# Patient Record
Sex: MALE | Race: WHITE | Employment: OTHER | URBAN - METROPOLITAN AREA
[De-identification: names, ages, dates, MRNs, and addresses within clinical notes are randomized per-mention and may not be internally consistent; named-entity substitution may affect disease eponyms.]

---

## 2020-01-01 ENCOUNTER — APPOINTMENT (OUTPATIENT)
Dept: GENERAL RADIOLOGY | Facility: CLINIC | Age: 85
DRG: 641 | End: 2020-01-01
Attending: EMERGENCY MEDICINE
Payer: MEDICARE

## 2020-01-01 ENCOUNTER — PATIENT OUTREACH (OUTPATIENT)
Dept: CARE COORDINATION | Facility: CLINIC | Age: 85
End: 2020-01-01

## 2020-01-01 ENCOUNTER — RECORDS - HEALTHEAST (OUTPATIENT)
Dept: ADMINISTRATIVE | Facility: OTHER | Age: 85
End: 2020-01-01

## 2020-01-01 ENCOUNTER — HOSPITAL ENCOUNTER (OUTPATIENT)
Dept: OCCUPATIONAL THERAPY | Facility: CLINIC | Age: 85
Setting detail: THERAPIES SERIES
End: 2020-02-27
Attending: PSYCHIATRY & NEUROLOGY
Payer: MEDICARE

## 2020-01-01 ENCOUNTER — HOME CARE/HOSPICE - HEALTHEAST (OUTPATIENT)
Dept: HOSPICE | Facility: HOSPICE | Age: 85
End: 2020-01-01

## 2020-01-01 ENCOUNTER — MEDICAL CORRESPONDENCE (OUTPATIENT)
Dept: HEALTH INFORMATION MANAGEMENT | Facility: CLINIC | Age: 85
End: 2020-01-01

## 2020-01-01 ENCOUNTER — OFFICE VISIT (OUTPATIENT)
Dept: NEUROLOGY | Facility: CLINIC | Age: 85
End: 2020-01-01
Payer: MEDICARE

## 2020-01-01 ENCOUNTER — DOCUMENTATION ONLY (OUTPATIENT)
Dept: OTHER | Facility: CLINIC | Age: 85
End: 2020-01-01

## 2020-01-01 ENCOUNTER — HOME CARE/HOSPICE - HEALTHEAST (OUTPATIENT)
Dept: HOME HEALTH SERVICES | Facility: HOME HEALTH | Age: 85
End: 2020-01-01

## 2020-01-01 ENCOUNTER — TELEPHONE (OUTPATIENT)
Dept: FAMILY MEDICINE | Facility: CLINIC | Age: 85
End: 2020-01-01

## 2020-01-01 ENCOUNTER — OFFICE VISIT (OUTPATIENT)
Dept: FAMILY MEDICINE | Facility: CLINIC | Age: 85
End: 2020-01-01
Payer: MEDICARE

## 2020-01-01 ENCOUNTER — HOSPITAL ENCOUNTER (INPATIENT)
Facility: CLINIC | Age: 85
LOS: 1 days | Discharge: HOME | DRG: 641 | End: 2020-06-22
Attending: EMERGENCY MEDICINE | Admitting: HOSPITALIST
Payer: MEDICARE

## 2020-01-01 ENCOUNTER — HOSPITAL ENCOUNTER (OUTPATIENT)
Dept: MRI IMAGING | Facility: CLINIC | Age: 85
Discharge: HOME | End: 2020-02-19
Attending: PSYCHIATRY & NEUROLOGY | Admitting: PSYCHIATRY & NEUROLOGY
Payer: MEDICARE

## 2020-01-01 ENCOUNTER — APPOINTMENT (OUTPATIENT)
Dept: CT IMAGING | Facility: CLINIC | Age: 85
DRG: 641 | End: 2020-01-01
Attending: EMERGENCY MEDICINE
Payer: MEDICARE

## 2020-01-01 ENCOUNTER — VIRTUAL VISIT (OUTPATIENT)
Dept: FAMILY MEDICINE | Facility: CLINIC | Age: 85
End: 2020-01-01
Payer: MEDICARE

## 2020-01-01 ENCOUNTER — TELEPHONE (OUTPATIENT)
Dept: NEUROLOGY | Facility: CLINIC | Age: 85
End: 2020-01-01

## 2020-01-01 VITALS
HEART RATE: 57 BPM | OXYGEN SATURATION: 100 % | SYSTOLIC BLOOD PRESSURE: 142 MMHG | BODY MASS INDEX: 26.43 KG/M2 | WEIGHT: 165 LBS | DIASTOLIC BLOOD PRESSURE: 60 MMHG | TEMPERATURE: 97.6 F

## 2020-01-01 VITALS
TEMPERATURE: 97.5 F | SYSTOLIC BLOOD PRESSURE: 124 MMHG | WEIGHT: 163.5 LBS | BODY MASS INDEX: 26.28 KG/M2 | HEIGHT: 66 IN | HEART RATE: 61 BPM | DIASTOLIC BLOOD PRESSURE: 60 MMHG | OXYGEN SATURATION: 100 %

## 2020-01-01 VITALS
SYSTOLIC BLOOD PRESSURE: 120 MMHG | OXYGEN SATURATION: 98 % | TEMPERATURE: 98.8 F | HEART RATE: 78 BPM | DIASTOLIC BLOOD PRESSURE: 68 MMHG

## 2020-01-01 VITALS
TEMPERATURE: 97.6 F | WEIGHT: 165 LBS | SYSTOLIC BLOOD PRESSURE: 138 MMHG | HEART RATE: 57 BPM | BODY MASS INDEX: 26.43 KG/M2 | DIASTOLIC BLOOD PRESSURE: 62 MMHG

## 2020-01-01 VITALS — HEIGHT: 66 IN | BODY MASS INDEX: 26.52 KG/M2 | WEIGHT: 165 LBS

## 2020-01-01 VITALS
RESPIRATION RATE: 22 BRPM | OXYGEN SATURATION: 93 % | DIASTOLIC BLOOD PRESSURE: 61 MMHG | HEART RATE: 64 BPM | TEMPERATURE: 97.9 F | SYSTOLIC BLOOD PRESSURE: 149 MMHG

## 2020-01-01 VITALS — WEIGHT: 140 LBS | HEIGHT: 70 IN | BODY MASS INDEX: 20.04 KG/M2

## 2020-01-01 DIAGNOSIS — F02.80 OTHER FRONTOTEMPORAL DEMENTIA WITHOUT BEHAVIORAL DISTURBANCE: ICD-10-CM

## 2020-01-01 DIAGNOSIS — R63.0 APPETITE IMPAIRED: ICD-10-CM

## 2020-01-01 DIAGNOSIS — R41.3 MEMORY LOSS: ICD-10-CM

## 2020-01-01 DIAGNOSIS — F03.90 DEMENTIA WITHOUT BEHAVIORAL DISTURBANCE, UNSPECIFIED DEMENTIA TYPE: Primary | ICD-10-CM

## 2020-01-01 DIAGNOSIS — R26.89 BALANCE PROBLEMS: ICD-10-CM

## 2020-01-01 DIAGNOSIS — G31.09 OTHER FRONTOTEMPORAL DEMENTIA WITHOUT BEHAVIORAL DISTURBANCE: ICD-10-CM

## 2020-01-01 DIAGNOSIS — F41.9 ANXIETY: ICD-10-CM

## 2020-01-01 DIAGNOSIS — G47.00 INSOMNIA, UNSPECIFIED TYPE: Primary | ICD-10-CM

## 2020-01-01 DIAGNOSIS — Z00.00 ENCOUNTER FOR MEDICARE ANNUAL WELLNESS EXAM: Primary | ICD-10-CM

## 2020-01-01 DIAGNOSIS — R29.6 RECURRENT FALLS: ICD-10-CM

## 2020-01-01 DIAGNOSIS — N39.498 OTHER URINARY INCONTINENCE: ICD-10-CM

## 2020-01-01 DIAGNOSIS — R41.3 MEMORY LOSS: Primary | ICD-10-CM

## 2020-01-01 DIAGNOSIS — Z20.822 2019-NCOV NOT DETECTED: ICD-10-CM

## 2020-01-01 DIAGNOSIS — F03.90 DEMENTIA WITHOUT BEHAVIORAL DISTURBANCE, UNSPECIFIED DEMENTIA TYPE: ICD-10-CM

## 2020-01-01 DIAGNOSIS — M79.89 SWELLING OF LOWER EXTREMITY: ICD-10-CM

## 2020-01-01 DIAGNOSIS — F02.80 DEMENTIA ASSOCIATED WITH OTHER UNDERLYING DISEASE WITHOUT BEHAVIORAL DISTURBANCE (H): Primary | ICD-10-CM

## 2020-01-01 LAB
ALBUMIN SERPL-MCNC: 3.5 G/DL (ref 3.4–5)
ALBUMIN SERPL-MCNC: 4 G/DL (ref 3.4–5)
ALBUMIN UR-MCNC: 10 MG/DL
ALP SERPL-CCNC: 65 U/L (ref 40–150)
ALP SERPL-CCNC: 73 U/L (ref 40–150)
ALT SERPL W P-5'-P-CCNC: 17 U/L (ref 0–70)
ALT SERPL W P-5'-P-CCNC: 22 U/L (ref 0–70)
ANION GAP SERPL CALCULATED.3IONS-SCNC: 3 MMOL/L (ref 3–14)
ANION GAP SERPL CALCULATED.3IONS-SCNC: 6 MMOL/L (ref 3–14)
ANION GAP SERPL CALCULATED.3IONS-SCNC: 8 MMOL/L (ref 3–14)
APPEARANCE UR: CLEAR
AST SERPL W P-5'-P-CCNC: 14 U/L (ref 0–45)
AST SERPL W P-5'-P-CCNC: 46 U/L (ref 0–45)
BASOPHILS # BLD AUTO: 0.1 10E9/L (ref 0–0.2)
BASOPHILS NFR BLD AUTO: 0.5 %
BILIRUB SERPL-MCNC: 0.8 MG/DL (ref 0.2–1.3)
BILIRUB SERPL-MCNC: 1.6 MG/DL (ref 0.2–1.3)
BILIRUB UR QL STRIP: NEGATIVE
BUN SERPL-MCNC: 13 MG/DL (ref 7–30)
BUN SERPL-MCNC: 17 MG/DL (ref 7–30)
BUN SERPL-MCNC: 19 MG/DL (ref 7–30)
CALCIUM SERPL-MCNC: 8.7 MG/DL (ref 8.5–10.1)
CALCIUM SERPL-MCNC: 8.8 MG/DL (ref 8.5–10.1)
CALCIUM SERPL-MCNC: 8.9 MG/DL (ref 8.5–10.1)
CHLORIDE SERPL-SCNC: 109 MMOL/L (ref 94–109)
CHLORIDE SERPL-SCNC: 110 MMOL/L (ref 94–109)
CHLORIDE SERPL-SCNC: 110 MMOL/L (ref 94–109)
CO2 SERPL-SCNC: 24 MMOL/L (ref 20–32)
CO2 SERPL-SCNC: 24 MMOL/L (ref 20–32)
CO2 SERPL-SCNC: 30 MMOL/L (ref 20–32)
COLOR UR AUTO: YELLOW
CREAT SERPL-MCNC: 0.66 MG/DL (ref 0.66–1.25)
CREAT SERPL-MCNC: 0.75 MG/DL (ref 0.66–1.25)
CREAT SERPL-MCNC: 0.78 MG/DL (ref 0.66–1.25)
CRP SERPL-MCNC: 5.2 MG/L (ref 0–8)
DIFFERENTIAL METHOD BLD: ABNORMAL
EOSINOPHIL # BLD AUTO: 0.1 10E9/L (ref 0–0.7)
EOSINOPHIL NFR BLD AUTO: 0.7 %
ERYTHROCYTE [DISTWIDTH] IN BLOOD BY AUTOMATED COUNT: 18.8 % (ref 10–15)
ERYTHROCYTE [DISTWIDTH] IN BLOOD BY AUTOMATED COUNT: 19 % (ref 10–15)
ERYTHROCYTE [DISTWIDTH] IN BLOOD BY AUTOMATED COUNT: 19.2 % (ref 10–15)
GFR SERPL CREATININE-BSD FRML MDRD: 82 ML/MIN/{1.73_M2}
GFR SERPL CREATININE-BSD FRML MDRD: 83 ML/MIN/{1.73_M2}
GFR SERPL CREATININE-BSD FRML MDRD: 88 ML/MIN/{1.73_M2}
GLUCOSE SERPL-MCNC: 73 MG/DL (ref 70–99)
GLUCOSE SERPL-MCNC: 87 MG/DL (ref 70–99)
GLUCOSE SERPL-MCNC: 94 MG/DL (ref 70–99)
GLUCOSE UR STRIP-MCNC: NEGATIVE MG/DL
HCT VFR BLD AUTO: 35.8 % (ref 40–53)
HCT VFR BLD AUTO: 36.7 % (ref 40–53)
HCT VFR BLD AUTO: 38.2 % (ref 40–53)
HGB BLD-MCNC: 11.4 G/DL (ref 13.3–17.7)
HGB BLD-MCNC: 11.7 G/DL (ref 13.3–17.7)
HGB BLD-MCNC: 12.3 G/DL (ref 13.3–17.7)
HGB UR QL STRIP: ABNORMAL
IMM GRANULOCYTES # BLD: 0 10E9/L (ref 0–0.4)
IMM GRANULOCYTES NFR BLD: 0.4 %
INTERPRETATION ECG - MUSE: NORMAL
KETONES UR STRIP-MCNC: 10 MG/DL
LEUKOCYTE ESTERASE UR QL STRIP: NEGATIVE
LYMPHOCYTES # BLD AUTO: 1.7 10E9/L (ref 0.8–5.3)
LYMPHOCYTES NFR BLD AUTO: 16.1 %
MCH RBC QN AUTO: 29.2 PG (ref 26.5–33)
MCH RBC QN AUTO: 29.4 PG (ref 26.5–33)
MCH RBC QN AUTO: 29.8 PG (ref 26.5–33)
MCHC RBC AUTO-ENTMCNC: 31.8 G/DL (ref 31.5–36.5)
MCHC RBC AUTO-ENTMCNC: 31.9 G/DL (ref 31.5–36.5)
MCHC RBC AUTO-ENTMCNC: 32.2 G/DL (ref 31.5–36.5)
MCV RBC AUTO: 91 FL (ref 78–100)
MCV RBC AUTO: 92 FL (ref 78–100)
MCV RBC AUTO: 93 FL (ref 78–100)
METHYLMALONATE SERPL-SCNC: 0.66 UMOL/L (ref 0–0.4)
MONOCYTES # BLD AUTO: 1 10E9/L (ref 0–1.3)
MONOCYTES NFR BLD AUTO: 9.8 %
MUCOUS THREADS #/AREA URNS LPF: PRESENT /LPF
NEUTROPHILS # BLD AUTO: 7.4 10E9/L (ref 1.6–8.3)
NEUTROPHILS NFR BLD AUTO: 72.5 %
NITRATE UR QL: NEGATIVE
NRBC # BLD AUTO: 0 10*3/UL
NRBC BLD AUTO-RTO: 0 /100
PH UR STRIP: 6 PH (ref 5–7)
PLATELET # BLD AUTO: 298 10E9/L (ref 150–450)
PLATELET # BLD AUTO: 310 10E9/L (ref 150–450)
PLATELET # BLD AUTO: 357 10E9/L (ref 150–450)
POTASSIUM SERPL-SCNC: 3.6 MMOL/L (ref 3.4–5.3)
POTASSIUM SERPL-SCNC: 3.9 MMOL/L (ref 3.4–5.3)
POTASSIUM SERPL-SCNC: 4 MMOL/L (ref 3.4–5.3)
PROT SERPL-MCNC: 7.2 G/DL (ref 6.8–8.8)
PROT SERPL-MCNC: 7.5 G/DL (ref 6.8–8.8)
RBC # BLD AUTO: 3.91 10E12/L (ref 4.4–5.9)
RBC # BLD AUTO: 3.93 10E12/L (ref 4.4–5.9)
RBC # BLD AUTO: 4.19 10E12/L (ref 4.4–5.9)
RBC #/AREA URNS AUTO: <1 /HPF (ref 0–2)
SARS-COV-2 RNA SPEC QL NAA+PROBE: NOT DETECTED
SODIUM SERPL-SCNC: 140 MMOL/L (ref 133–144)
SODIUM SERPL-SCNC: 141 MMOL/L (ref 133–144)
SODIUM SERPL-SCNC: 143 MMOL/L (ref 133–144)
SOURCE: ABNORMAL
SP GR UR STRIP: 1.02 (ref 1–1.03)
SPECIMEN SOURCE: NORMAL
T4 FREE SERPL-MCNC: 0.85 NG/DL (ref 0.76–1.46)
TSH SERPL DL<=0.005 MIU/L-ACNC: 2.87 MU/L (ref 0.4–4)
TSH SERPL DL<=0.005 MIU/L-ACNC: 4.41 MU/L (ref 0.4–4)
UROBILINOGEN UR STRIP-MCNC: NORMAL MG/DL (ref 0–2)
VIT B1 BLD-MCNC: 167 NMOL/L (ref 70–180)
VIT B12 SERPL-MCNC: 253 PG/ML (ref 193–986)
WBC # BLD AUTO: 10 10E9/L (ref 4–11)
WBC # BLD AUTO: 10.2 10E9/L (ref 4–11)
WBC # BLD AUTO: 11.5 10E9/L (ref 4–11)
WBC #/AREA URNS AUTO: 2 /HPF (ref 0–5)

## 2020-01-01 PROCEDURE — G0378 HOSPITAL OBSERVATION PER HR: HCPCS

## 2020-01-01 PROCEDURE — 84425 ASSAY OF VITAMIN B-1: CPT | Mod: 90 | Performed by: PSYCHIATRY & NEUROLOGY

## 2020-01-01 PROCEDURE — 72125 CT NECK SPINE W/O DYE: CPT

## 2020-01-01 PROCEDURE — 80048 BASIC METABOLIC PNL TOTAL CA: CPT | Performed by: HOSPITALIST

## 2020-01-01 PROCEDURE — 84443 ASSAY THYROID STIM HORMONE: CPT | Performed by: EMERGENCY MEDICINE

## 2020-01-01 PROCEDURE — 25800030 ZZH RX IP 258 OP 636: Performed by: HOSPITALIST

## 2020-01-01 PROCEDURE — 72170 X-RAY EXAM OF PELVIS: CPT

## 2020-01-01 PROCEDURE — 99239 HOSP IP/OBS DSCHRG MGMT >30: CPT | Performed by: INTERNAL MEDICINE

## 2020-01-01 PROCEDURE — 85027 COMPLETE CBC AUTOMATED: CPT | Performed by: HOSPITALIST

## 2020-01-01 PROCEDURE — 25000132 ZZH RX MED GY IP 250 OP 250 PS 637: Mod: GY | Performed by: FAMILY MEDICINE

## 2020-01-01 PROCEDURE — 99207 ZZC CDG-CODE CATEGORY CHANGED: CPT | Performed by: INTERNAL MEDICINE

## 2020-01-01 PROCEDURE — 97165 OT EVAL LOW COMPLEX 30 MIN: CPT | Mod: GO | Performed by: OCCUPATIONAL THERAPIST

## 2020-01-01 PROCEDURE — 83921 ORGANIC ACID SINGLE QUANT: CPT | Performed by: PSYCHIATRY & NEUROLOGY

## 2020-01-01 PROCEDURE — 97535 SELF CARE MNGMENT TRAINING: CPT | Mod: GO | Performed by: OCCUPATIONAL THERAPIST

## 2020-01-01 PROCEDURE — 86140 C-REACTIVE PROTEIN: CPT | Performed by: EMERGENCY MEDICINE

## 2020-01-01 PROCEDURE — 99213 OFFICE O/P EST LOW 20 MIN: CPT | Mod: 25 | Performed by: FAMILY MEDICINE

## 2020-01-01 PROCEDURE — 99397 PER PM REEVAL EST PAT 65+ YR: CPT | Performed by: FAMILY MEDICINE

## 2020-01-01 PROCEDURE — 84439 ASSAY OF FREE THYROXINE: CPT | Performed by: PSYCHIATRY & NEUROLOGY

## 2020-01-01 PROCEDURE — 36415 COLL VENOUS BLD VENIPUNCTURE: CPT | Performed by: FAMILY MEDICINE

## 2020-01-01 PROCEDURE — 70450 CT HEAD/BRAIN W/O DYE: CPT

## 2020-01-01 PROCEDURE — 12000001 ZZH R&B MED SURG/OB UMMC

## 2020-01-01 PROCEDURE — 80053 COMPREHEN METABOLIC PANEL: CPT | Performed by: FAMILY MEDICINE

## 2020-01-01 PROCEDURE — 93005 ELECTROCARDIOGRAM TRACING: CPT

## 2020-01-01 PROCEDURE — 80053 COMPREHEN METABOLIC PANEL: CPT | Performed by: EMERGENCY MEDICINE

## 2020-01-01 PROCEDURE — 70551 MRI BRAIN STEM W/O DYE: CPT

## 2020-01-01 PROCEDURE — 96360 HYDRATION IV INFUSION INIT: CPT

## 2020-01-01 PROCEDURE — 99285 EMERGENCY DEPT VISIT HI MDM: CPT | Mod: Z6 | Performed by: EMERGENCY MEDICINE

## 2020-01-01 PROCEDURE — 99205 OFFICE O/P NEW HI 60 MIN: CPT | Performed by: PSYCHIATRY & NEUROLOGY

## 2020-01-01 PROCEDURE — 84443 ASSAY THYROID STIM HORMONE: CPT | Performed by: PSYCHIATRY & NEUROLOGY

## 2020-01-01 PROCEDURE — 96361 HYDRATE IV INFUSION ADD-ON: CPT

## 2020-01-01 PROCEDURE — 99215 OFFICE O/P EST HI 40 MIN: CPT | Performed by: PSYCHIATRY & NEUROLOGY

## 2020-01-01 PROCEDURE — 99233 SBSQ HOSP IP/OBS HIGH 50: CPT | Performed by: INTERNAL MEDICINE

## 2020-01-01 PROCEDURE — 99214 OFFICE O/P EST MOD 30 MIN: CPT | Mod: 95 | Performed by: FAMILY MEDICINE

## 2020-01-01 PROCEDURE — C9803 HOPD COVID-19 SPEC COLLECT: HCPCS | Performed by: EMERGENCY MEDICINE

## 2020-01-01 PROCEDURE — 36415 COLL VENOUS BLD VENIPUNCTURE: CPT | Performed by: HOSPITALIST

## 2020-01-01 PROCEDURE — 93010 ELECTROCARDIOGRAM REPORT: CPT | Performed by: INTERNAL MEDICINE

## 2020-01-01 PROCEDURE — 85027 COMPLETE CBC AUTOMATED: CPT | Performed by: FAMILY MEDICINE

## 2020-01-01 PROCEDURE — 99000 SPECIMEN HANDLING OFFICE-LAB: CPT | Performed by: PSYCHIATRY & NEUROLOGY

## 2020-01-01 PROCEDURE — 81001 URINALYSIS AUTO W/SCOPE: CPT | Performed by: EMERGENCY MEDICINE

## 2020-01-01 PROCEDURE — U0003 INFECTIOUS AGENT DETECTION BY NUCLEIC ACID (DNA OR RNA); SEVERE ACUTE RESPIRATORY SYNDROME CORONAVIRUS 2 (SARS-COV-2) (CORONAVIRUS DISEASE [COVID-19]), AMPLIFIED PROBE TECHNIQUE, MAKING USE OF HIGH THROUGHPUT TECHNOLOGIES AS DESCRIBED BY CMS-2020-01-R: HCPCS | Performed by: EMERGENCY MEDICINE

## 2020-01-01 PROCEDURE — 85025 COMPLETE CBC W/AUTO DIFF WBC: CPT | Performed by: EMERGENCY MEDICINE

## 2020-01-01 PROCEDURE — 99285 EMERGENCY DEPT VISIT HI MDM: CPT | Mod: 25 | Performed by: EMERGENCY MEDICINE

## 2020-01-01 PROCEDURE — 99220 ZZC INITIAL OBSERVATION CARE,LEVL III: CPT | Mod: AI | Performed by: HOSPITALIST

## 2020-01-01 PROCEDURE — 82607 VITAMIN B-12: CPT | Performed by: PSYCHIATRY & NEUROLOGY

## 2020-01-01 PROCEDURE — 99203 OFFICE O/P NEW LOW 30 MIN: CPT | Performed by: FAMILY MEDICINE

## 2020-01-01 RX ORDER — ACETAMINOPHEN 325 MG/1
650 TABLET ORAL EVERY 4 HOURS PRN
Status: DISCONTINUED | OUTPATIENT
Start: 2020-01-01 | End: 2020-01-01

## 2020-01-01 RX ORDER — MIRTAZAPINE 7.5 MG/1
7.5 TABLET, FILM COATED ORAL AT BEDTIME
Qty: 30 TABLET | Refills: 1 | Status: SHIPPED | OUTPATIENT
Start: 2020-01-01 | End: 2020-01-01

## 2020-01-01 RX ORDER — FUROSEMIDE 20 MG
20 TABLET ORAL DAILY
Qty: 30 TABLET | Refills: 1 | Status: SHIPPED | OUTPATIENT
Start: 2020-01-01 | End: 2020-01-01

## 2020-01-01 RX ORDER — DONEPEZIL HYDROCHLORIDE 5 MG/1
TABLET, FILM COATED ORAL
Qty: 45 TABLET | Refills: 0 | Status: CANCELLED | OUTPATIENT
Start: 2020-01-01

## 2020-01-01 RX ORDER — ONDANSETRON 2 MG/ML
4 INJECTION INTRAMUSCULAR; INTRAVENOUS EVERY 6 HOURS PRN
Status: DISCONTINUED | OUTPATIENT
Start: 2020-01-01 | End: 2020-01-01 | Stop reason: HOSPADM

## 2020-01-01 RX ORDER — ACETAMINOPHEN 650 MG/1
650 SUPPOSITORY RECTAL EVERY 4 HOURS PRN
Status: DISCONTINUED | OUTPATIENT
Start: 2020-01-01 | End: 2020-01-01 | Stop reason: HOSPADM

## 2020-01-01 RX ORDER — ACETAMINOPHEN 325 MG/1
650 TABLET ORAL EVERY 4 HOURS PRN
Status: DISCONTINUED | OUTPATIENT
Start: 2020-01-01 | End: 2020-01-01 | Stop reason: HOSPADM

## 2020-01-01 RX ORDER — POLYETHYLENE GLYCOL 3350 17 G/17G
17 POWDER, FOR SOLUTION ORAL DAILY PRN
Status: DISCONTINUED | OUTPATIENT
Start: 2020-01-01 | End: 2020-01-01 | Stop reason: HOSPADM

## 2020-01-01 RX ORDER — ACETAMINOPHEN 325 MG/1
650 TABLET ORAL ONCE
Status: COMPLETED | OUTPATIENT
Start: 2020-01-01 | End: 2020-01-01

## 2020-01-01 RX ORDER — NALOXONE HYDROCHLORIDE 0.4 MG/ML
.1-.4 INJECTION, SOLUTION INTRAMUSCULAR; INTRAVENOUS; SUBCUTANEOUS
Status: DISCONTINUED | OUTPATIENT
Start: 2020-01-01 | End: 2020-01-01 | Stop reason: HOSPADM

## 2020-01-01 RX ORDER — ONDANSETRON 4 MG/1
4 TABLET, ORALLY DISINTEGRATING ORAL EVERY 6 HOURS PRN
Status: DISCONTINUED | OUTPATIENT
Start: 2020-01-01 | End: 2020-01-01 | Stop reason: HOSPADM

## 2020-01-01 RX ADMIN — SODIUM CHLORIDE 1000 ML: 9 INJECTION, SOLUTION INTRAVENOUS at 21:28

## 2020-01-01 RX ADMIN — ACETAMINOPHEN 650 MG: 325 TABLET, FILM COATED ORAL at 09:59

## 2020-01-01 ASSESSMENT — MIFFLIN-ST. JEOR
SCORE: 1373.35
SCORE: 1368.25
SCORE: 1326.29

## 2020-01-01 ASSESSMENT — ENCOUNTER SYMPTOMS
CONFUSION: 0
NECK STIFFNESS: 0
ABDOMINAL PAIN: 0
FEVER: 0
DIFFICULTY URINATING: 0
COLOR CHANGE: 0
ARTHRALGIAS: 0
SHORTNESS OF BREATH: 0
SPEECH DIFFICULTY: 1
EYE REDNESS: 0
HEADACHES: 0

## 2020-01-01 ASSESSMENT — MONTREAL COGNITIVE ASSESSMENT (MOCA)
4. NAME EACH OF THE THREE ANIMALS SHOWN: 3
8. SERIAL SUBTRACTION OF 7S: 0
11. FOR EACH PAIR OF WORDS, WHAT CATEGORY DO THEY BELONG TO (OUT OF 2): 0
12. MEMORY INDEX SCORE: 0
6. READ LIST OF DIGITS [FORWARD/BACKWARD]: 1
10. [FLUENCY] NAME WORDS STARTING WITH DESIGNATED LETTER: 0
13. ORIENTATION SUBSCORE: 2
WHAT LEVEL OF EDUCATION WAS ATTAINED: 0
7. [VIGILENCE] TAP WHEN HEARING DESIGNATED LETTER: 0
WHAT IS THE TOTAL SCORE (OUT OF 30): 6
VISUOSPATIAL/EXECUTIVE SUBSCORE: 0
9. REPEAT EACH SENTENCE: 0

## 2020-01-14 NOTE — PROGRESS NOTES
"Subjective     Aureliano Knight is a 85 year old male who presents to clinic today for the following health issues:    HPI   Forms for metro mobility   Dementia and balance problems. He uses cane for ambulation.   No medication.  Recently moved from [place].  Daughter and wife care takers.       Reviewed and updated as needed this visit by Provider       Reviewed PMH, PSH, FH, Medication and Allergies.   Review of Systems   ROS COMP: Constitutional, HEENT, cardiovascular, pulmonary, gi and gu systems are negative, except as otherwise noted.      Objective    There were no vitals taken for this visit.  There is no height or weight on file to calculate BMI.  Physical Exam   /60   Pulse 61   Temp 97.5  F (36.4  C) (Oral)   Ht 1.683 m (5' 6.25\")   Wt 74.2 kg (163 lb 8 oz)   SpO2 100%   BMI 26.19 kg/m    GENERAL: healthy, alert and no distress  EYES: Eyes grossly normal to inspection  HENT: nose and mouth without ulcers or lesions  RESP: non labored   MS: no gross musculoskeletal defects noted  SKIN: no suspicious lesions or rashes  NEURO: speech normal  PSYCH:  affect normal    Diagnostic Test Results:  none         Assessment & Plan     Dementia without behavioral disturbance, Balance problems  - forms filled and given to daughter   - copy scanned into chart       Deshawanda Lee MD  [facility]      "

## 2020-02-11 NOTE — LETTER
February 24, 2020      Aureliano Knight  [address] [place] [address]        Dear ,    We are writing to inform you of your test results.    Here are your results for your recent labs.   Your kidney functions are mildly decreased and unsure if this is your baseline as we do not have any previous labs. I would recommend to continue to stay hydrated and avoid daily NSAIDs (aleve, aspirin or Advil).   Your hemoglobin is mildly decreased. In reviewing your labs, your vitamin B12 is mildly decreased. Can you please follow up to further discuss treatment options.   Please call or message me if you have questions or concerns.     Resulted Orders   CBC with platelets   Result Value Ref Range    WBC 10.0 4.0 - 11.0 10e9/L    RBC Count 3.93 (L) 4.4 - 5.9 10e12/L    Hemoglobin 11.7 (L) 13.3 - 17.7 g/dL    Hematocrit 36.7 (L) 40.0 - 53.0 %    MCV 93 78 - 100 fl    MCH 29.8 26.5 - 33.0 pg    MCHC 31.9 31.5 - 36.5 g/dL    RDW 19.2 (H) 10.0 - 15.0 %    Platelet Count 357 150 - 450 10e9/L   Comprehensive metabolic panel (BMP + Alb, Alk Phos, ALT, AST, Total. Bili, TP)   Result Value Ref Range    Sodium 140 133 - 144 mmol/L    Potassium 4.0 3.4 - 5.3 mmol/L    Chloride 110 (H) 94 - 109 mmol/L    Carbon Dioxide 24 20 - 32 mmol/L    Anion Gap 6 3 - 14 mmol/L    Glucose 94 70 - 99 mg/dL      Comment:      Fasting specimen    Urea Nitrogen 19 7 - 30 mg/dL    Creatinine 0.78 0.66 - 1.25 mg/dL    GFR Estimate 82 >60 mL/min/[1.73_m2]      Comment:      Non  GFR Calc  Starting 12/18/2018, serum creatinine based estimated GFR (eGFR) will be   calculated using the Chronic Kidney Disease Epidemiology Collaboration   (CKD-EPI) equation.      GFR Estimate If Black >90 >60 mL/min/[1.73_m2]      Comment:       GFR Calc  Starting 12/18/2018, serum creatinine based estimated GFR (eGFR) will be   calculated using the Chronic Kidney Disease Epidemiology Collaboration   (CKD-EPI)  equation.      Calcium 8.7 8.5 - 10.1 mg/dL    Bilirubin Total 0.8 0.2 - 1.3 mg/dL    Albumin 4.0 3.4 - 5.0 g/dL    Protein Total 7.5 6.8 - 8.8 g/dL    Alkaline Phosphatase 65 40 - 150 U/L    ALT 17 0 - 70 U/L    AST 14 0 - 45 U/L       If you have any questions or concerns, please call the clinic at the number listed above.       Sincerely,        Jean Paul Lee MD/nr

## 2020-02-11 NOTE — PROGRESS NOTES
"    SUBJECTIVE:   Aureliano Knight is a 85 year old male who presents for Preventive Visit.    Are you in the first 12 months of your Medicare Part B coverage?  No    Physical Health:    In general, how would you rate your overall physical health? good    Outside of work, how many days during the week do you exercise? none    Outside of work, approximately how many minutes a day do you exercise?not applicable    If you drink alcohol do you typically have >3 drinks per day or >7 drinks per week? No    Do you usually eat at least 4 servings of fruit and vegetables a day, include whole grains & fiber and avoid regularly eating high fat or \"junk\" foods? NO    Do you have any problems taking medications regularly?  No    Do you have any side effects from medications? not applicable    Needs assistance for the following daily activities: telephone use, transportation, shopping, preparing meals, housework, bathing, laundry and money management    Which of the following safety concerns are present in your home?  none identified     Hearing impairment: No    In the past 6 months, have you been bothered by leaking of urine? no    Mental Health:    In general, how would you rate your overall mental or emotional health? good  PHQ-2 Score:      Do you feel safe in your environment? Yes    Have you ever done Advance Care Planning? (For example, a Health Directive, POLST, or a discussion with a medical provider or your loved ones about your wishes): Yes, patient states has an Advance Care Planning document and will bring a copy to the clinic.    Additional concerns to address?  YES    Fall risk:  Fallen 2 or more times in the past year?: Yes  Any fall with injury in the past year?: No    Cognitive Screenin) Repeat 3 items (Leader, Season, Table)      2) Clock draw: ABNORMAL   3) 3 item recall: Recalls NO objects   Results: ABNORMAL clock, 1-2 items recalled: PROBABLE COGNITIVE IMPAIRMENT,     Mini-CogTM Copyright S " "Betty. Licensed by the author for use in [facility]; reprinted with permission (pradeep@[facility]). All rights reserved.      Do you have sleep apnea, excessive snoring or daytime drowsiness?: no        Swollen ankles and feet - Over one year swelling in the ankles and feet. Symptoms have mildly improved since moving to [place]. No dyspnea, orthopnea, PND or chest pain. Normal urination. He is not using RADHA stockings. He has been putting his feet up. No heart or chronic kidney problems.     Reviewed and updated as needed this visit by clinical staff         Reviewed and updated as needed this visit by Provider        Social History     Tobacco Use     Smoking status: Not on file   Substance Use Topics     Alcohol use: Not on file                           Current providers sharing in care for this patient include:   Patient Care Team:  Jean Paul Lee MD as PCP - General (Family Practice)  Jean Paul Lee MD as Assigned PCP    The following health maintenance items are reviewed in Epic and correct as of today:  Health Maintenance   Topic Date Due     ADVANCE CARE PLANNING  11/07/1934     DTAP/TDAP/TD IMMUNIZATION (1 - Tdap) 11/07/1945     ZOSTER IMMUNIZATION (1 of 2) 11/07/1984     MEDICARE ANNUAL WELLNESS VISIT  11/07/1999     FALL RISK ASSESSMENT  11/07/1999     PNEUMOCOCCAL IMMUNIZATION 65+ LOW/MEDIUM RISK (1 of 2 - PCV13) 11/07/1999     INFLUENZA VACCINE (1) 09/01/2019     PHQ-2  01/01/2020     IPV IMMUNIZATION  Aged Out     MENINGITIS IMMUNIZATION  Aged Out           ROS:  + dementia, Constitutional, HEENT, cardiovascular, pulmonary, gi and gu systems are negative, except as otherwise noted.    OBJECTIVE:   There were no vitals taken for this visit. Estimated body mass index is 26.19 kg/m  as calculated from the following:    Height as of 1/14/20: 1.683 m (5' 6.25\").    Weight as of 1/14/20: 74.2 kg (163 lb 8 oz).  EXAM:   GENERAL: healthy, alert and no distress  EYES: Eyes grossly normal to inspection, " "conjunctivae and sclerae normal  HENT: nose and mouth without ulcers or lesions  NECK: no adenopathy, no asymmetry, masses, or scars and thyroid normal to palpation  RESP: lungs clear to auscultation - no rales, rhonchi or wheezes  CV: regular rate and rhythm, normal S1 S2  ABDOMEN: soft, nontender, no hepatosplenomegaly, no masses and bowel sounds normal  MS: b/l lower extremity 2+  SKIN: no suspicious lesions or rashes  NEURO: speech normal  PSYCH: affect normal    Diagnostic Test Results:  none     ASSESSMENT / PLAN:     1. Encounter for Medicare annual wellness exam  - Pt declined immunizations and age appropriate cancer screening.   - Given advance care directives.     2. Swelling of lower extremity  - Pt opted for treatment and declined further evaluation including BNP and echocardiogram or RADHA stockings.   - Discussed s/e of medication. furosemide (LASIX) 20 MG tablet; Take 1 tablet (20 mg) by mouth daily  Dispense: 30 tablet; Refill: 1  - CBC with platelets  - Comprehensive metabolic panel (BMP + Alb, Alk Phos, ALT, AST, Total. Bili, TP)  - Advised to follow up in one month to reassess edema and recheck BMP.   - Pt agreeable with plan.     3. Dementia without behavioral disturbance, unspecified dementia type (H)  - NEUROLOGY ADULT REFERRAL      COUNSELING:  Reviewed preventive health counseling, as reflected in patient instructions    Estimated body mass index is 26.19 kg/m  as calculated from the following:    Height as of 1/14/20: 1.683 m (5' 6.25\").    Weight as of 1/14/20: 74.2 kg (163 lb 8 oz).         has no history on file for tobacco.      Appropriate preventive services were discussed with this patient, including applicable screening as appropriate for cardiovascular disease, diabetes, osteopenia/osteoporosis, and glaucoma.  As appropriate for age/gender, discussed screening for colorectal cancer, prostate cancer, breast cancer, and cervical cancer. Checklist reviewing preventive services available " has been given to the patient.    Reviewed patients plan of care and provided an AVS. The Basic Care Plan (routine screening as documented in Health Maintenance) for Aureliano meets the Care Plan requirement. This Care Plan has been established and reviewed with the Patient.    Counseling Resources:  ATP IV Guidelines  Pooled Cohorts Equation Calculator  Breast Cancer Risk Calculator  FRAX Risk Assessment  ICSI Preventive Guidelines  Dietary Guidelines for Americans, 2010  USDA's MyPlate  ASA Prophylaxis  Lung CA Screening    Jean Paul Lee MD  [facility]

## 2020-02-11 NOTE — PATIENT INSTRUCTIONS
AFTER VISIT SUMMARY (AVS):    At today's visit we thoroughly discussed various diagnostic possibilities for your symptoms, necessary evaluation, and the plan, which includes:  Orders Placed This Encounter   Procedures     MR Brain w/o Contrast     Vitamin B1 whole blood     Methylmalonic Acid     TSH with free T4 reflex     Vitamin B12     OCCUPATIONAL THERAPY REFERRAL     No new medications.    Stay physically and mentally active with particular emphasis on daily mentally stimulating activities of your choice (such as crosswords, puzzles, sudoku, etc.), stretching exercises, walking, and healthy eating.     Additional recommendations after the work-up.    Next follow-up appointment is in the next 3 to 4 weeks or earlier if needed.    Please do not hesitate to call me with any questions or concerns.    Thanks.

## 2020-02-11 NOTE — PATIENT INSTRUCTIONS
Lasix 20 mg daily   Please follow up in one month         Patient Education   Personalized Prevention Plan  You are due for the preventive services outlined below.  Your care team is available to assist you in scheduling these services.  If you have already completed any of these items, please share that information with your care team to update in your medical record.  Health Maintenance Due   Topic Date Due     Discuss Advance Care Planning  11/07/1934     Diptheria Tetanus Pertussis (DTAP/TDAP/TD) Vaccine (1 - Tdap) 11/07/1945     Zoster (Shingles) Vaccine (1 of 2) 11/07/1984     Annual Wellness Visit  11/07/1999     FALL RISK ASSESSMENT  11/07/1999     Pneumococcal Vaccine (1 of 2 - PCV13) 11/07/1999     Flu Vaccine (1) 09/01/2019     PHQ-2  01/01/2020        Patient Education   Personalized Prevention Plan  You are due for the preventive services outlined below.  Your care team is available to assist you in scheduling these services.  If you have already completed any of these items, please share that information with your care team to update in your medical record.  Health Maintenance Due   Topic Date Due     Discuss Advance Care Planning  11/07/1934     Diptheria Tetanus Pertussis (DTAP/TDAP/TD) Vaccine (1 - Tdap) 11/07/1945     Zoster (Shingles) Vaccine (1 of 2) 11/07/1984     Annual Wellness Visit  11/07/1999     FALL RISK ASSESSMENT  11/07/1999     Pneumococcal Vaccine (1 of 2 - PCV13) 11/07/1999     Flu Vaccine (1) 09/01/2019     PHQ-2  01/01/2020

## 2020-02-11 NOTE — LETTER
"    2/11/2020         RE: Aureliano Knight  [address]  [place] [address]        Dear Colleague,    Thank you for referring your patient, Aureliano Knight, to the [facility]. Please see a copy of my visit note below.    INITIAL NEUROLOGY CONSULTATION    DATE OF VISIT: 2/11/2020  CLINIC LOCATION: [facility]  MRN: [id number]  PATIENT NAME: Aureliano Knight  YOB: 1934    PRIMARY CARE PROVIDER: Jean Paul Lee MD.     REASON FOR VISIT:   Chief Complaint   Patient presents with     Memory Loss     started about 7 years      HISTORY OF PRESENT ILLNESS:                                                    Mr. Aureliano Knight is 85 year old right handed male patient with past medical history of dementia and balance difficulty, who was seen in consultation today requested by Jean Paul Lee MD, for memory loss.  The patient is accompanied by his wife and daughter, who participate in the interview and provide most of the information.    Per patient's report, he came to the clinic today because his \"wife noticed strange behaviors\".  He thinks that his memory is not good.  Struggles with finding the right words.  Does not take any medications.  Quit driving approximately 2 years ago.  Occasionally, takes ibuprofen for sore neck.  When questioned more regarding reasons for this visit, patient states that \"this lady has hard time to understand\" what he is trying to say pointing toward his daughter.  When asked how she is related to him, he was not able to answer.    According to patient's family, progressive cognitive decline started approximately 7 years ago and is worsening over time, especially in the last 2 years.  He consistently struggles to find the correct words.  Short-term memory is preferentially affected.  Periodically he gets confused.  He frequently repeats himself and misplaces his belongings.  He got lost while living in [place] " prompting them to move to [place] to be closer to their daughter.  Wife needed to take over their finances approximately 4 or 5 years ago, because the patient was not able to handle them in a timely manner.  Has trouble with calendar.  Wife also reports increased irritability, and at times anger.  She did not notice any additional mood or personality changes.  No additional concerns.    Any pressure or strong emotions (anxiety) makes his symptoms worse.  Structured environment makes symptoms better.  Tried mild sedative, but it just put him to sleep and caused anxiety.  No other treatments tried.    CBC and CMP were ordered today by Dr. Lee.  No prior brain imaging.  Recently moved from [place], and no records are available for review.    Review of Systems - the patient endorses feet edema, loss of libido, anxiety, muscle tenderness (neck and shoulders), easy skin bruising, and feet numbness.  All of them have been previously discussed with other medical providers.  Otherwise, he denies any other complaints on 14-point comprehensive review of systems.  PAST MEDICAL/SURGICAL HISTORY:                                                    I personally reviewed patient's past medical and surgical history with the patient at today's visit.  1.  Dementia.  2.  Balance problems.  3.  Appendectomy.  4.  Cholecystectomy.  MEDICATIONS:                                                    I personally reviewed patient's medications and allergies with the patient at today's visit.  Furosemide 20 mg by mouth in the morning was started by Dr. Lee today.  ALLERGIES:                                                      Allergies   Allergen Reactions     Penicillins      FAMILY/SOCIAL HISTORY:                                                    Family and social history was reviewed with the patient at today's visit.  Family history is positive for dementia/Alzheimer's disease and Parkinson's disease.  , lives with his  wife.  Retired.  Recently moved from [place].  Never smoker.  2 glasses of wine 2 times per week.  Denies use of recreational drugs.  REVIEW OF SYSTEMS:                                                    Patient has completed a [facility] Patient Health History, including a 14-system review, which was personally reviewed, and pertinent positives are listed in HPI. He denies any additional problems on the further questioning.  EXAM:                                                    VITAL SIGNS:   BP (!) 142/60   Pulse 57   Temp 97.6  F (36.4  C) (Oral)   Wt 74.8 kg (165 lb)   BMI 26.43 kg/m      Repeat blood pressure: /62 (BP Location: Left arm, Patient Position: Sitting, Cuff Size: Adult Regular)   Pulse 57   Temp 97.6  F (36.4  C) (Oral)   Wt 74.8 kg (165 lb)   BMI 26.43 kg/m   .  Mihai Cognitive Assessment:    [place] Cognitive Assessment (MOCA)  Visuospatial/Executive : 0  Naming: 3  Attention - Digits: 1  Attention - Letters: 0  Attention - Subtraction: 0  Language - Repeat: 0  Language - Fluency : 0  Abstraction: 0  Delayed Recall: 0  Orientation: 2  Education: 0  MOCA Score: 6     [place] Cognitive Assessment Score:  MOCA Score: 6/30.     General: pt is in NAD, cooperative.  Skin: normal turgor, moist mucous membranes, no lesions/rashes noticed.  Bilateral feet edema.  HEENT: ATNC, EOMI, PERRL, white sclera, normal conjunctiva, no nystagmus or ptosis. No carotid bruits bilaterally.  Respiratory: lung sounds clear to auscultation bilaterally, no crackles, wheezes, rhonchi. Symmetric lung excursion, no accessory respiratory muscle use.  Cardiovascular: normal S1/S2, no murmurs/rubs/gallops.   Abdomen: Not distended.  : deferred.    Neurological:  Mental: alert, follows simple commands eventually, but they need to be repeated several times, has difficulty understanding directions for certain parts of the exam, MoCA as per above, no aphasia or dysarthria. Fund of knowledge is  significantly diminished for age.  Cranial Nerves:  CN II: visual acuity - able to accurately count fingers with the right eye, makes mistakes while counting with the left eye. Visual fields intact, fundi: discs sharp, no papilledema and normal vessels bilaterally.  CN III, IV, VI: EOM appeared to be full, though the patient has difficulty following the directions, pupils equal and reactive  CN V: facial sensation nl  CN VII: face symmetric, no facial droop  CN VIII: hearing normal  CN IX: palate elevation symmetric, uvula at midline  CN XI SCM normal, shoulder shrug nl  CN XII: tongue midline  Motor: Strength: 5/5 in all major groups of all extremities.  Mild intermittent bilateral postural hand tremor.  Slightly increased bilateral upper extremity tone. No other abnormal movements. No pronator drift b/l.  Reflexes: Triceps, biceps, brachioradialis, patellar, and achilles reflexes diminished, but symmetric. No clonus noted. Toes are down-going b/l.   Sensory: temperature, light touch, pinprick, and vibration intact. Romberg: negative.  Coordination: FNF tests intact b/l.   Gait: Slightly stooped forward posture, diminished arm swings.  Turns with 3-4 steps.  Unsteady.  DATA:   LABS/IMAGING/OTHER STUDIES: I reviewed pertinent medical records, as detailed in the history of present illness.  ASSESSMENT AND PLAN:      ASSESSMENT: Aureliano Knight is a 85 year old male patient with history of dementia and balance difficulties, who presents with memory loss started approximately 7 years ago and progressively getting worse, especially over the last 2 years.    We had a prolonged conversation with the patient and his family regarding his presenting complaints.  His MOCA today is 6/30, consistent with at least moderate if not severe dementia.  The rest of the neurological exam demonstrates mild postural tremor, slightly increased upper extremity muscle tone, stooped forward posture and diminished arm swings. No recent  labs.  No prior brain imaging.    The clinical presentation might be consistent with vitamin deficiencies, thyroid dysfunction, structural or vascular brain lesions, but most likely it is due to a neurodegenerative condition with Alzheimer's disease being most common.  Lewy body disease or other Parkinson-plus conditions are also in the differential given the presence of mild extrapyramidal features.  For further diagnostic clarification I ordered screening labs, CPT, and brain MRI.    Aureliano to follow up with Primary Care provider regarding elevated blood pressure.     DIAGNOSES:    ICD-10-CM    1. Memory loss R41.3 Vitamin B1 whole blood     Methylmalonic Acid     TSH with free T4 reflex     Vitamin B12     MR Brain w/o Contrast     OCCUPATIONAL THERAPY REFERRAL     PLAN: At today's visit we thoroughly discussed various diagnostic possibilities for patient's symptoms, necessary evaluation, and the plan, which includes:  Orders Placed This Encounter   Procedures     MR Brain w/o Contrast     Vitamin B1 whole blood     Methylmalonic Acid     TSH with free T4 reflex     Vitamin B12     OCCUPATIONAL THERAPY REFERRAL     No new medications.    I counseled the patient to stay physically and mentally active with particular emphasis on daily mentally stimulating activities of   his choice (such as crosswords, puzzles, sudoku, etc.), stretching exercises, walking, and healthy eating.     Additional recommendations after the work-up.    Next follow-up appointment is in the next 3 to 4 weeks or earlier if needed.    Total Time:  91 minutes with > 50% spent counseling the patient and his family on stated above assessment and recommendations, including nature of the diagnosis, needed w/u, and proposed plan.  Additional time was used to answer questions regarding patient's symptoms, my recommendations, and the plan.    Sunny Merino MD  / Neurology  [place]  (Chart documentation was completed in part with  Dragon voice-recognition software. Even though reviewed, some grammatical, spelling, and word errors may remain.)            Again, thank you for allowing me to participate in the care of your patient.        Sincerely,        Sunny Merino MD

## 2020-02-11 NOTE — PROGRESS NOTES
"INITIAL NEUROLOGY CONSULTATION    DATE OF VISIT: 2/11/2020  CLINIC LOCATION: [facility]  MRN: [id number]  PATIENT NAME: Aureliano Knight  YOB: 1934    PRIMARY CARE PROVIDER: Jean Paul Lee MD.     REASON FOR VISIT:   Chief Complaint   Patient presents with     Memory Loss     started about 7 years      HISTORY OF PRESENT ILLNESS:                                                    Mr. Aureliano Knight is 85 year old right handed male patient with past medical history of dementia and balance difficulty, who was seen in consultation today requested by Jean Paul Lee MD, for memory loss.  The patient is accompanied by his wife and daughter, who participate in the interview and provide most of the information.    Per patient's report, he came to the clinic today because his \"wife noticed strange behaviors\".  He thinks that his memory is not good.  Struggles with finding the right words.  Does not take any medications.  Quit driving approximately 2 years ago.  Occasionally, takes ibuprofen for sore neck.  When questioned more regarding reasons for this visit, patient states that \"this lady has hard time to understand\" what he is trying to say pointing toward his daughter.  When asked how she is related to him, he was not able to answer.    According to patient's family, progressive cognitive decline started approximately 7 years ago and is worsening over time, especially in the last 2 years.  He consistently struggles to find the correct words.  Short-term memory is preferentially affected.  Periodically he gets confused.  He frequently repeats himself and misplaces his belongings.  He got lost while living in [place] prompting them to move to [place] to be closer to their daughter.  Wife needed to take over their finances approximately 4 or 5 years ago, because the patient was not able to handle them in a timely manner.  Has trouble with calendar.  Wife also reports " increased irritability, and at times anger.  She did not notice any additional mood or personality changes.  No additional concerns.    Any pressure or strong emotions (anxiety) makes his symptoms worse.  Structured environment makes symptoms better.  Tried mild sedative, but it just put him to sleep and caused anxiety.  No other treatments tried.    CBC and CMP were ordered today by Dr. Lee.  No prior brain imaging.  Recently moved from [place], and no records are available for review.    Review of Systems - the patient endorses feet edema, loss of libido, anxiety, muscle tenderness (neck and shoulders), easy skin bruising, and feet numbness.  All of them have been previously discussed with other medical providers.  Otherwise, he denies any other complaints on 14-point comprehensive review of systems.  PAST MEDICAL/SURGICAL HISTORY:                                                    I personally reviewed patient's past medical and surgical history with the patient at today's visit.  1.  Dementia.  2.  Balance problems.  3.  Appendectomy.  4.  Cholecystectomy.  MEDICATIONS:                                                    I personally reviewed patient's medications and allergies with the patient at today's visit.  Furosemide 20 mg by mouth in the morning was started by Dr. Lee today.  ALLERGIES:                                                      Allergies   Allergen Reactions     Penicillins      FAMILY/SOCIAL HISTORY:                                                    Family and social history was reviewed with the patient at today's visit.  Family history is positive for dementia/Alzheimer's disease and Parkinson's disease.  , lives with his wife.  Retired.  Recently moved from [place].  Never smoker.  2 glasses of wine 2 times per week.  Denies use of recreational drugs.  REVIEW OF SYSTEMS:                                                    Patient has completed a [facility] Patient  Health History, including a 14-system review, which was personally reviewed, and pertinent positives are listed in HPI. He denies any additional problems on the further questioning.  EXAM:                                                    VITAL SIGNS:   BP (!) 142/60   Pulse 57   Temp 97.6  F (36.4  C) (Oral)   Wt 74.8 kg (165 lb)   BMI 26.43 kg/m     Repeat blood pressure: /62 (BP Location: Left arm, Patient Position: Sitting, Cuff Size: Adult Regular)   Pulse 57   Temp 97.6  F (36.4  C) (Oral)   Wt 74.8 kg (165 lb)   BMI 26.43 kg/m  .  [place] Cognitive Assessment:    [place] Cognitive Assessment (MOCA)  Visuospatial/Executive : 0  Naming: 3  Attention - Digits: 1  Attention - Letters: 0  Attention - Subtraction: 0  Language - Repeat: 0  Language - Fluency : 0  Abstraction: 0  Delayed Recall: 0  Orientation: 2  Education: 0  MOCA Score: 6     [place] Cognitive Assessment Score:  MOCA Score: 6/30.     General: pt is in NAD, cooperative.  Skin: normal turgor, moist mucous membranes, no lesions/rashes noticed.  Bilateral feet edema.  HEENT: ATNC, EOMI, PERRL, white sclera, normal conjunctiva, no nystagmus or ptosis. No carotid bruits bilaterally.  Respiratory: lung sounds clear to auscultation bilaterally, no crackles, wheezes, rhonchi. Symmetric lung excursion, no accessory respiratory muscle use.  Cardiovascular: normal S1/S2, no murmurs/rubs/gallops.   Abdomen: Not distended.  : deferred.    Neurological:  Mental: alert, follows simple commands eventually, but they need to be repeated several times, has difficulty understanding directions for certain parts of the exam, MoCA as per above, no aphasia or dysarthria. Fund of knowledge is significantly diminished for age.  Cranial Nerves:  CN II: visual acuity - able to accurately count fingers with the right eye, makes mistakes while counting with the left eye. Visual fields intact, fundi: discs sharp, no papilledema and normal vessels  bilaterally.  CN III, IV, VI: EOM appeared to be full, though the patient has difficulty following the directions, pupils equal and reactive  CN V: facial sensation nl  CN VII: face symmetric, no facial droop  CN VIII: hearing normal  CN IX: palate elevation symmetric, uvula at midline  CN XI SCM normal, shoulder shrug nl  CN XII: tongue midline  Motor: Strength: 5/5 in all major groups of all extremities.  Mild intermittent bilateral postural hand tremor.  Slightly increased bilateral upper extremity tone. No other abnormal movements. No pronator drift b/l.  Reflexes: Triceps, biceps, brachioradialis, patellar, and achilles reflexes diminished, but symmetric. No clonus noted. Toes are down-going b/l.   Sensory: temperature, light touch, pinprick, and vibration intact. Romberg: negative.  Coordination: FNF tests intact b/l.   Gait: Slightly stooped forward posture, diminished arm swings.  Turns with 3-4 steps.  Unsteady.  DATA:   LABS/IMAGING/OTHER STUDIES: I reviewed pertinent medical records, as detailed in the history of present illness.  ASSESSMENT AND PLAN:      ASSESSMENT: Aureliano Knight is a 85 year old male patient with history of dementia and balance difficulties, who presents with memory loss started approximately 7 years ago and progressively getting worse, especially over the last 2 years.    We had a prolonged conversation with the patient and his family regarding his presenting complaints.  His MOCA today is 6/30, consistent with at least moderate if not severe dementia.  The rest of the neurological exam demonstrates mild postural tremor, slightly increased upper extremity muscle tone, stooped forward posture and diminished arm swings. No recent labs.  No prior brain imaging.    The clinical presentation might be consistent with vitamin deficiencies, thyroid dysfunction, structural or vascular brain lesions, but most likely it is due to a neurodegenerative condition with Alzheimer's disease  being most common.  Lewy body disease or other Parkinson-plus conditions are also in the differential given the presence of mild extrapyramidal features.  For further diagnostic clarification I ordered screening labs, CPT, and brain MRI.    Aureliano to follow up with Primary Care provider regarding elevated blood pressure.     DIAGNOSES:    ICD-10-CM    1. Memory loss R41.3 Vitamin B1 whole blood     Methylmalonic Acid     TSH with free T4 reflex     Vitamin B12     MR Brain w/o Contrast     OCCUPATIONAL THERAPY REFERRAL     PLAN: At today's visit we thoroughly discussed various diagnostic possibilities for patient's symptoms, necessary evaluation, and the plan, which includes:  Orders Placed This Encounter   Procedures     MR Brain w/o Contrast     Vitamin B1 whole blood     Methylmalonic Acid     TSH with free T4 reflex     Vitamin B12     OCCUPATIONAL THERAPY REFERRAL     No new medications.    I counseled the patient to stay physically and mentally active with particular emphasis on daily mentally stimulating activities of  his choice (such as crosswords, puzzles, sudoku, etc.), stretching exercises, walking, and healthy eating.     Additional recommendations after the work-up.    Next follow-up appointment is in the next 3 to 4 weeks or earlier if needed.    Total Time:  91 minutes with > 50% spent counseling the patient and his family on stated above assessment and recommendations, including nature of the diagnosis, needed w/u, and proposed plan.  Additional time was used to answer questions regarding patient's symptoms, my recommendations, and the plan.    Sunny Merino MD  / Neurology  [place]  (Chart documentation was completed in part with Dragon voice-recognition software. Even though reviewed, some grammatical, spelling, and word errors may remain.)

## 2020-02-27 NOTE — PROGRESS NOTES
[facility]          OUTPATIENT OCCUPATIONAL THERAPY  EVALUATION  PLAN OF TREATMENT FOR OUTPATIENT REHABILITATION  (COMPLETE FOR INITIAL CLAIMS ONLY)  Patient's Last Name, First Name, M.I.  YOB: 1934  Aureliano Knight                           Provider's Name  [facility] Medical Record No.  [id number]                               Onset Date:     02/11/20   Start of Care Date:     02/27/20   Type:     ___PT   _X_OT   ___SLP Medical Diagnosis:     Memory loss R41.3                           OT Diagnosis:     Decreased safety and independence in ADL's  Visits from SOC:  1   _________________________________________________________________________________  Plan of Treatment/Functional Goals:  ADL training                    Goals  Goal Identifier: Caregiver Education   Goal Description: Family/Caregiver will verbalize understanding of deficits and recommended level of assistance based on objective findings as well as clinical judgement   Target Date: 02/27/20  Date Met: 02/27/20                                        Therapy Frequency: 1x treatment      Predicted Duration of Therapy Intervention (days/wks): 1x evaluation and treatment   Greta Roth OT          I CERTIFY THE NEED FOR THESE SERVICES FURNISHED UNDER        THIS PLAN OF TREATMENT AND WHILE UNDER MY CARE     (Physician co-signature of this document indicates review and certification of the therapy plan).                 ,    Certification date from: 02/27/20, Certification date to: 03/08/20               Referring Physician: Dr. Sunny Merino MD      Initial Assessment        See Epic Evaluation      Start Of Care Date: 02/27/20

## 2020-02-27 NOTE — PROGRESS NOTES
"   02/27/20 0830   Quick Adds   Quick Adds Certification   Type of Visit Initial Outpatient Occupational Therapy Evaluation       Present No   General Information   Start Of Care Date 02/27/20   Referring Physician Dr. Sunny Merino MD    Orders Evaluate and treat as indicated;Other   Other Orders Cognitive Assessment    Orders Date 02/11/20   Medical Diagnosis Memory loss R41.3    Onset of Illness/Injury or Date of Surgery 02/11/20   Precautions/Limitations Fall precautions   Surgical/Medical History Reviewed Yes   Additional Occupational Profile Info/Pertinent History of Current Problem Per neurology note \"Mr. Aureliano Knight is 85 year old right handed male patient with past medical history of dementia and balance difficulty, who was seen in consultation today requested by Jean Paul Lee MD, for memory loss.  The patient is accompanied by his wife and daughter, who participate in the interview and provide most of the information. Per patient's report, he came to the clinic today because his \"wife noticed strange behaviors\".  He thinks that his memory is not good.  Struggles with finding the right words.  Does not take any medications.  Quit driving approximately 2 years ago.  Occasionally, takes ibuprofen for sore neck.  When questioned more regarding reasons for this visit, patient states that \"this lady has hard time to understand\" what he is trying to say pointing toward his daughter.  When asked how she is related to him, he was not able to answer.  According to patient's family, progressive cognitive decline started approximately 7 years ago and is worsening over time, especially in the last 2 years.  He consistently struggles to find the correct words.  Short-term memory is preferentially affected.  Periodically he gets confused.  He frequently repeats himself and misplaces his belongings.  He got lost while living in [place] prompting them to move to [place] to be " "closer to their daughter.  Wife needed to take over their finances approximately 4 or 5 years ago, because the patient was not able to handle them in a timely manner.  Has trouble with calendar.  Wife also reports increased irritability, and at times anger.  She did not notice any additional mood or personality changes.  No additional concerns.\"    Role/Living Environment   Current Community Support Family/friend caregiver   Patient role/Employment history Retired  (Has a [employer] (PhD), retired from the [employer] )   Community/Avocational Activities \"memories\"    Current Living Environment Apartment/condo   Number of Stairs to Enter Home 0   Number of Stairs Within Home 0   Primary Bathroom Location/Comments Main Floor    Primary Bathroom Set Up/Equipment Shower stall  (Has access to shower chair but does not utilize )   Prior Level - Transfers Independent   Prior Level - Ambulation Independent  (Has walker for longer distances, gets in the way frequently )   Prior Level - ADLS Assistive person   Prior Responsibilities - IADL   (Recieves assistance for all IADL's )   Prior Level Comments Patient requires assistance for all IADL's, some assistance with dressing reported (orienting shirt, providing outfit layout etc)    Current Assistive Devices - Mobility Walker  (For longer distances, does not always utilize )   Current Assistive Devices - ADL   (Has access to shower bench )   Role/Living Environment Comments Lives with his wife, daughter has been assisting daily due to decline in function.  Wife and daughter are available to 24/7 assistance.     Patient/family Goals Statement Not able to formally state, daughter wants him to be as safe as possible.     Pain   Patient currently in pain No;Denies   Fall Risk Screen   Fall screen completed by OT   Have you fallen 2 or more times in the past year? No   Have you fallen and had an injury in the past year? No   Is patient a fall risk? Yes;Department fall risk " interventions implemented   Fall screen comments Age, memory and cognitive deficits    Cognitive Status Examination   Orientation Person   Level of Consciousness Confused;Alert   Follows Commands and Answers Questions 50% of the time;Able to follow single-step instructions;Unable to follow multi-step instructions   Personal Safety and Judgment Impaired   Memory Impaired   Attention Distractible during evaluation;Quiet environment required;Sustained attention impaired;Selective attention impaired, difficulty ignoring irrelevant stimuli;Alternating attention impaired, difficulty shifting between tasks;Divided attention impaired, difficulty with simultaneous tasks   Organization/Problem Solving Sequencing impaired;Problem solving impaired;Categorization of information impaired;Prioritizing of information/tasks impaired   Executive Function Impulsive;Working memory impaired, decreased storage of information for performing tasks;Cognitive flexibility impaired;Planning ability impaired;Self awareness/monitoring impaired;Initiation impaired   Cognitive Comment Refer to CPT note.     Visual Perception   Visual Perception Wears glasses   Sensation   Upper Extremity Sensory Examination No deficits were identified   Range of Motion (ROM)   ROM Comments UE ROM WFL    Strength   Strength Comments Generalized weakness consistent with age and low activity level    Hand Strength   Hand Dominance Right   Coordination   Upper Extremity Coordination Left UE impaired;Right UE impaired   Coordination Comments Takes +1 minute bilaterally to complete with cues to persist in task, needing repeition of instructions and visual cues on technique.    Transfer Skill   Level of [place]: Transfers independent   Bathing   Level of [place] - Bathing stand-by assist   Physical Assist/Nonphysical Assist - Bathing supervision;verbal cues   Upper Body Dressing   Level of [place]: Dress Upper Body minimum assist (75% patients effort)    Physical Assist/Nonphysical Assist: Dress Upper Body 1 person assist   Lower Body Dressing   Level of [place]: Dress Lower Body stand-by assist   Physical Assist/Nonphysical Assist: Dress Lower Body supervision   Toileting   Level of [place]: Toilet independent   Grooming   Level of [place]: Grooming independent   Eating/Self-Feeding   Level of [place]: Eating independent   Activity Tolerance   Activity Tolerance Poor, patient reports that he doesnt get out much    Planned Therapy Interventions   Planned Therapy Interventions ADL training   Adult OT Eval Goals   OT Eval Goals (Adult) 1    OT Goal 1   Goal Identifier Caregiver Education    Goal Description Family/Caregiver will verbalize understanding of deficits and recommended level of assistance based on objective findings as well as clinical judgement    Target Date 02/27/20   Date Met 02/27/20   Clinical Impression   Criteria for Skilled Therapeutic Interventions Met Yes, treatment indicated   OT Diagnosis Decreased safety and independence in ADL's    Influenced by the following impairments impaired cogniton, including attention, problem solving and memory    Assessment of Occupational Performance 5 or more Performance Deficits   Identified Performance Deficits dressing, bathing, IADL's such as meal prep, med management and household management    Clinical Decision Making (Complexity) Low complexity   Therapy Frequency 1x treatment    Predicted Duration of Therapy Intervention (days/wks) 1x evaluation and treatment    Risks and Benefits of Treatment have been explained. Yes   Patient, Family & other staff in agreement with plan of care Yes   Education Assessment   Barriers To Learning Cognitive   Preferred Learning Style Demonstration;Pictures/video   Therapy Certification   Certification date from 02/27/20   Certification date to 03/08/20   Certification I certify the need for these services furnished under this plan of treatment and while  under my care.  (Physician co-signature of this document indicates review and certification of the therapy plan)   Total Evaluation Time   OT Eval, Low Complexity Minutes (40264) 15       Thank you for referring Aureliano to Occupational TherapyGreta/MARY

## 2020-02-27 NOTE — PROGRESS NOTES
Cognitive Performance Test    SUMMARY OF TEST:    The Cognitive Performance Test (CPT) is a standardized performance-based assessment to measure working memory/executive function processing capacities that underlie functional performance. Subtasks include common basic and instrumental activities of daily living (ADL/IADL) which are rated based on the manner in which patients respond to task demands of varying complexity. The total CPT score describes a level of functioning that indicates how information is processed, implications for functional activities, potential safety risks and a recommended level of supervision or assist based on cognitive function. The highest total score on this test is in the range of 5.6 to 5.8.    DATE OF TESTIN20     RESULTS OF TESTING:                                                                                           CPT Subtest Results    MEDBOX: -/6 SHOP/GLOVES: 3.5/6 PHONE: -/6   WASH:  3.5/5 TRAVEL: -/6 TOAST: -/5   DRESS: -/5   TOTAL CPT SCORE:  3.5/5.6     Average CPT Score  3.5/5.6     INTERPRETATION OF TEST RESULTS:    Based on the Cognitive Performance Test, this patient scored at CPT Level 3.5.  See CPT Levels reference below.    Summary of functional cognitive status:   This writer did not complete full CPT with patient this date due to difficulty with direction following and increased anxiety- negatively influencing performance.  To that end, it was not felt beneficial to continue with testing in a standardized manner given patient's reported and documented performance and baseline functioning ( I.e. does not cook for himself, gets assist with ADL set-up, does not participate in medication set-up or administration and does not compete financial management). Based on the 2 subtests completed and data reported by both Aureliano and his do, a CPT score of 3.5 was obtained.        Recommendations:  Supervision in living settin/7 supervision within home  environment, consider PCA/respite services due to moderate to severe cognitive decline, unsafe to independently participate in meal preparation or household management tasks.        TIME ADMINISTERING TEST: 20     TIME FOR INTERPRETATION AND PREPARATION OF REPORT: 5    TOTAL TIME: 25      CPT Levels Reference:    Patient's Average CPT Score:  3.5                                                                                                                                                  Individual scores range along a continuum as outlined below.  In addition to cognitive status, other factors may affect safety in a home environment.  Please refer to specific recommendations for this patient.    ___5.6-5.8  Normal functioning (absence of cognitive-functional disability).  Independent in managing personal affairs, monitors and directs own behavior.  Uses complex information to carry out daily activities with safety and accuracy.    Proficient with instrumental activities of daily living (IADL) and learning new activity.  Problems are anticipated, errors are avoided, and consequences of actions are considered.      ___5.0   Mild cognitive-functional disability; deficits in working memory and executive thought processes. Difficulty using complex information. Problems may be observed with recent memory, judgment, reasoning and planning ahead. May be impulsive or have difficulty anticipating consequences.  Safety:  May require assistance to plan ahead; or to manage complex medication schedules, appointments or finances.  Hazardous activities may need to be monitored or limited.  ADL:  Mild functional decline.  Able to complete basic self-care and routine household tasks.  May have difficulty with complex daily tasks such as reading, writing, meal preparation, shopping or driving.   Learns through hands on teaching. Self-centered behavior or difficulty considering the needs of others may be seen related to trouble  "seeing the  whole picture\". Can appear disorganized or uninhibited.    ___4.5  Mild to moderate cognitive-functional disability. Significant deficits in working memory and executive thought processes. Judgment, reasoning and planning show obvious impairment.  Distractible with inability to shift attention/actions given competing stimuli.  Difficulty with problem solving and managing details. Complex daily tasks performed with inconsistency, difficulty, or error.     Safety:  Medications should be monitored, stove use may require supervision, and driving ability may be affected.  Impaired safety awareness with inability to anticipate potential problems.  May not recognize or respond to emergent situations. Requires frequent check-in support.   ADL:  Mild difficulty with simple everyday self-care tasks. Benefits from structured, routine activity.  Will likely need reminders to complete tasks outside of the routine. Requires assistance with planning and IADL tasks like shopping and finances. Learns concrete tasks through repetition, but performance may not generalize. Tends to be impulsive with poor insight. Self centered behavior or inability to consider the needs of others is common.    ___4.0  Moderate cognitive-functional disability; abstract to concrete thought processes. Working memory and executive function impairments are obvious. Difficulty with planning and problem solving.  Behavior is goal-directed, but unable to follow multi-step directions, is easily distracted, and may not recognize mistakes.  Inability to anticipate hazards or understand precautions.  Safety:  Recommend 24-hour supervision for safety. Supervision needed for medication management and for hazardous activities. May not be able to follow a restricted diet. Can get lost in unfamiliar surroundings. Generally, persons functioning at level 4 should not be driving.   ADL:  Some decline in quality or frequency of ADL.  Rochelle enhanced by " use of a routine, simple concrete directions, and caregiver set-up of needed items. Complex tasks such as money or home management typically requires assistance.  Relies heavily on vision to guide behavior; will ignore objects/hazards not in plain sight and can be distracted by irrelevant objects. Often has poor insight.  Able to carry out social conversation and may verbally  cover  for deficits leading caregivers to believe they are capable of functioning independently.       _x_3.5  Moderate cognitive-functional disability; increased cues needed for task completion. Aware of concrete task steps but needs prompting or cues to initiate and complete simple tasks. Attention span is limited, simple directions may need to be repeated, and re-focus to a topic or task may be required.  Safety:  24-hour supervision required for safety and for assistance with daily tasks. Assistance required with medications, and access to medication should be limited. Meals, nutrition and dietary restrictions need to be monitored.  All hazardous activities should be restricted or supervised. Should not drive. Prone to wandering and can become lost.  ADL:  Moderate functional decline. Familiar tasks usually requires set-up of supplies and directions to complete steps. May need objects handed to them for task initiation. Function best with a set schedule in familiar surroundings with familiar people. All complex tasks must be done by others. Vocabulary is diminished and speech often unfocused.

## 2020-03-06 NOTE — LETTER
3/6/2020         RE: Aureliano Knight  [address]  [place] [address]        Dear Colleague,    Thank you for referring your patient, Aureliano Knight, to the [facility]. Please see a copy of my visit note below.    ESTABLISHED PATIENT NEUROLOGY NOTE    DATE OF VISIT: 3/6/2020  CLINIC LOCATION: [facility]  MRN: [id number]  PATIENT NAME: Aureliano Knight  YOB: 1934    PCP: Jean Paul Lee MD    REASON FOR VISIT:   Chief Complaint   Patient presents with     Follow Up     MRI OT     SUBJECTIVE:                                                      HISTORY OF PRESENT ILLNESS: Patient is here for follow up regarding dementia. Please refer to my initial note from 2/11/2020 for further information.  The patient is accompanied by his daughter, who participates in the interview.    Since the last visit, the patient reports no significant changes in his symptoms.  He denies interval development of new focal neurological symptoms.  His daughter agrees.  She has no additional concerns.    Laboratory work-up, performed at the initial visit, demonstrated vitamin B12 in low normal range (253), but his methylmalonic acid was elevated at 0.66, suggesting vitamin B12 deficiency.  He was advised to start B12 replacement under the guidance of his primary care provider, but did not do it yet.  TSH was elevated at 4.41.  B1 and free T4 were normal.    Brain MRI with and without contrast from 2/19/2020 demonstrated anterior temporal, inferior frontal, and anterior parietal predominant cerebral atrophy disproportionate to other cerebral regions and lobes.  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  No acute intracranial findings were noted.    CPT from 2/27/2020 was 3.5/5.6 on 2 subsets.  It was not fully completed due to difficulty with direction following and increased anxiety.  24/7 supervision  within home environment and consideration of PCA/respite services was recommended due to moderate to severe cognitive decline.    On review of systems, patient endorses no other active complaints. Medications, allergies, family and social history were also reviewed. There are no changes reported by patient.  REVIEW OF SYSTEMS:                                                    10-system review was completed. Pertinent positives are included in HPI. The remainder of ROS is negative.  EXAM:                                                    Physical Exam:   Vitals: /68   Pulse 78   Temp 98.8  F (37.1  C)   SpO2 98%     General: pt is in NAD, cooperative.  Skin: normal turgor, moist mucous membranes, no lesions/rashes noticed.  HEENT: ATNC, white sclera, normal conjunctiva.  Respiratory: Symmetric lung excursion, no accessory respiratory muscle use.  Abdomen: Non distended.  Neurological: Somnolent, cooperative, follows commands, no exam changes compared to the initial visit.  DATA:   Labs/Imaging: I personally reviewed pertinent labs, CPT report, and brain MRI images, as detailed in history of present illness.  ASSESSMENT AND PLAN:                                                    Assessment: 85-year-old male patient with dementia presents for follow-up after the completion of recommended work-up.      We had a detailed discussion with the patient and his daughter regarding the results of the evaluation.  Vitamin B12 was low normal, while methylmalonic acid was elevated, suggesting vitamin B12 deficiency.  TSH was also elevated.  Brain MRI demonstrated predominant atrophy in the anterior temporal, inferior frontal, and anterior parietal lobes, which could be seen with Alzheimer disease versus less likely frontotemporal dementia.  CPT was only partially completed on 2 subsets and demonstrated score of 3.5/5.6, suggestive of moderate cognitive functional disability that requires 24-hour supervision for  safety.    The clinical presentation is most likely consistent with neurodegenerative condition with the differential including Alzheimer's disease, frontotemporal dementia, and Parkinson-plus conditions.  I reviewed that it is most likely a progressive condition with no cure, but a trial of anticholinesterase inhibitors and possibly Namenda should be considered.  We discussed side effects of donepezil that include anorexia, nausea, vomiting, diarrhea, muscle cramps, insomnia, heart rate abnormalities, and gastrointestinal bleeding with concomitant NSAIDs use.    The patient and his daughter need more time to think about starting donepezil.  Additional information was provided for home review.  They will contact the clinic when the patient is willing to try it.    Diagnoses:    ICD-10-CM    1. 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    We also discussed that his methylmalonic acid level was high with low normal B12, and TSH was elevated.  I advised his daughter to contact patient's primary care provider regarding these results and to initiate vitamin B12 replacement.    Occupational Therapy testing suggests the need for 24-hour supervision.  We discussed that planning for appropriate living environment should be started now as needs will continue to increase in the future.    Information regarding donepezil was provided for home review.  The daughter was advised to contact my clinic when the patient is ready to proceed.    Next follow-up appointment is in the next 6 months or earlier if needed.    Total Time: 41 minutes with > 50% spent counseling the patient and his  daughter on stated above assessment and recommendations.  Additional time was needed to explain evaluation results, review MRI images together with the patient and his daughter, my recommendations, treatment options, and the proposed plan.    Sunny Merino MD  / Neurology      Again, thank you for allowing me to participate in the care of your patient.        Sincerely,        Sunny Merino MD

## 2020-03-06 NOTE — PROGRESS NOTES
ESTABLISHED PATIENT NEUROLOGY NOTE    DATE OF VISIT: 3/6/2020  CLINIC LOCATION: [facility]  MRN: [id number]  PATIENT NAME: Aureliano Knight  YOB: 1934    PCP: Jean Paul Lee MD    REASON FOR VISIT:   Chief Complaint   Patient presents with     Follow Up     MRI OT     SUBJECTIVE:                                                      HISTORY OF PRESENT ILLNESS: Patient is here for follow up regarding dementia. Please refer to my initial note from 2/11/2020 for further information.  The patient is accompanied by his daughter, who participates in the interview.    Since the last visit, the patient reports no significant changes in his symptoms.  He denies interval development of new focal neurological symptoms.  His daughter agrees.  She has no additional concerns.    Laboratory work-up, performed at the initial visit, demonstrated vitamin B12 in low normal range (253), but his methylmalonic acid was elevated at 0.66, suggesting vitamin B12 deficiency.  He was advised to start B12 replacement under the guidance of his primary care provider, but did not do it yet.  TSH was elevated at 4.41.  B1 and free T4 were normal.    Brain MRI with and without contrast from 2/19/2020 demonstrated anterior temporal, inferior frontal, and anterior parietal predominant cerebral atrophy disproportionate to other cerebral regions and lobes.  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  No acute intracranial findings were noted.    CPT from 2/27/2020 was 3.5/5.6 on 2 subsets.  It was not fully completed due to difficulty with direction following and increased anxiety.  24/7 supervision within home environment and consideration of PCA/respite services was recommended due to moderate to severe cognitive decline.    On review of systems, patient endorses no other active complaints. Medications, allergies, family and social history were also  reviewed. There are no changes reported by patient.  REVIEW OF SYSTEMS:                                                    10-system review was completed. Pertinent positives are included in HPI. The remainder of ROS is negative.  EXAM:                                                    Physical Exam:   Vitals: /68   Pulse 78   Temp 98.8  F (37.1  C)   SpO2 98%     General: pt is in NAD, cooperative.  Skin: normal turgor, moist mucous membranes, no lesions/rashes noticed.  HEENT: ATNC, white sclera, normal conjunctiva.  Respiratory: Symmetric lung excursion, no accessory respiratory muscle use.  Abdomen: Non distended.  Neurological: Somnolent, cooperative, follows commands, no exam changes compared to the initial visit.  DATA:   Labs/Imaging: I personally reviewed pertinent labs, CPT report, and brain MRI images, as detailed in history of present illness.  ASSESSMENT AND PLAN:                                                    Assessment: 85-year-old male patient with dementia presents for follow-up after the completion of recommended work-up.      We had a detailed discussion with the patient and his daughter regarding the results of the evaluation.  Vitamin B12 was low normal, while methylmalonic acid was elevated, suggesting vitamin B12 deficiency.  TSH was also elevated.  Brain MRI demonstrated predominant atrophy in the anterior temporal, inferior frontal, and anterior parietal lobes, which could be seen with Alzheimer disease versus less likely frontotemporal dementia.  CPT was only partially completed on 2 subsets and demonstrated score of 3.5/5.6, suggestive of moderate cognitive functional disability that requires 24-hour supervision for safety.    The clinical presentation is most likely consistent with neurodegenerative condition with the differential including Alzheimer's disease, frontotemporal dementia, and Parkinson-plus conditions.  I reviewed that it is most likely a progressive condition  with no cure, but a trial of anticholinesterase inhibitors and possibly Namenda should be considered.  We discussed side effects of donepezil that include anorexia, nausea, vomiting, diarrhea, muscle cramps, insomnia, heart rate abnormalities, and gastrointestinal bleeding with concomitant NSAIDs use.    The patient and his daughter need more time to think about starting donepezil.  Additional information was provided for home review.  They will contact the clinic when the patient is willing to try it.    Diagnoses:    ICD-10-CM    1. 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    We also discussed that his methylmalonic acid level was high with low normal B12, and TSH was elevated.  I advised his daughter to contact patient's primary care provider regarding these results and to initiate vitamin B12 replacement.    Occupational Therapy testing suggests the need for 24-hour supervision.  We discussed that planning for appropriate living environment should be started now as needs will continue to increase in the future.    Information regarding donepezil was provided for home review.  The daughter was advised to contact my clinic when the patient is ready to proceed.    Next follow-up appointment is in the next 6 months or earlier if needed.    Total Time: 41 minutes with > 50% spent counseling the patient and his daughter on stated above assessment and recommendations.  Additional time was needed to explain evaluation results, review MRI images together with the patient and his daughter, my recommendations, treatment options, and the proposed plan.    Sunny Merino,  MD  [facility] Neurology

## 2020-03-06 NOTE — PATIENT INSTRUCTIONS
AFTER VISIT SUMMARY (AVS):    At today's visit we thoroughly discussed current symptoms, evaluation results (brain MRI demonstrates anterior temporal, inferior frontal, and anterior parietal predominant cerebral atrophy disproportionate to other cerebral regions and lobes), available treatment options, and the plan.    Your methylmalonic acid level was high with low normal B12, and your TSH was elevated.  Please contact your primary care provider regarding these results and to initiate vitamin B12 replacement.    Occupational Therapy testing suggests the need for 24-hour supervision.  Planning for appropriate living environment should be started now as needs will continue to increase in the future.    Information regarding donepezil was provided for home review.  Please contact my clinic when you decide to proceed.    Next follow-up appointment is in the next 6 months or earlier if needed.    Please do not hesitate to call me with any questions or concerns.    Thanks.

## 2020-05-06 NOTE — PROGRESS NOTES
"Aureliano Knight is a 85 year old male who is being evaluated via a billable video visit.      The patient has been notified of following:     \"This video visit will be conducted via a call between you and your physician/provider. We have found that certain health care needs can be provided without the need for an in-person physical exam.  This service lets us provide the care you need with a video conversation.  If a prescription is necessary we can send it directly to your pharmacy.  If lab work is needed we can place an order for that and you can then stop by our lab to have the test done at a later time.    Video visits are billed at different rates depending on your insurance coverage.  Please reach out to your insurance provider with any questions.    If during the course of the call the physician/provider feels a video visit is not appropriate, you will not be charged for this service.\"    Patient has given verbal consent for Video visit? Yes    How would you like to obtain your AVS? Mail a copy    Patient would like the video invitation sent by: Text to cell phone: [phone]    Will anyone else be joining your video visit? No       Subjective     Aureliano Knight is a 85 year old male who presents to clinic today for the following health issues:    HPI       Patient and daughter Shena would like to discuss home health care and skilled nursing.       Video Start Time: 8:32 AM    No longer remembers name of children and anything else.   Able to wash and eat. Appetite is getting is less.  His movements are impaired.   Getting up frequently at night time.  Wife tries to put him to bed.   Lost control of his bowel.   Feet and ankles are swollen. Water pills are somewhat helpful. Currently not on any meds.  He no longer explains things easily.   Since his office visit with neuro - symptoms are getting worse.   Lives with his wife and daughter comes daily for several hours.   Contacted county and does " "not qualify for PCA.  Currently living in one level condo. Simple and small place.          Reviewed and updated as needed this visit by Provider         Review of Systems   ROS COMP: Constitutional, HEENT, cardiovascular, pulmonary, gi and gu systems are negative, except as otherwise noted.      Objective    Ht 1.664 m (5' 5.5\")   Wt 74.8 kg (165 lb)   BMI 27.04 kg/m    Estimated body mass index is 27.04 kg/m  as calculated from the following:    Height as of this encounter: 1.664 m (5' 5.5\").    Weight as of this encounter: 74.8 kg (165 lb).  Physical Exam     GENERAL: Frail, alert, not in any acute distress  RESP: no audible wheeze, cough, or visible cyanosis.  No visible retractions or increased work of breathing.  Able to speak fully in complete sentences.  NEURO: Unable to examine his speech.  PSYCH: Sitting quietly next to his daughter who is holding camera.  Did not speak for entire appointment and did not communicate with me.  Head was leaning forward and he seated mostly in the same position for entire visit.         Assessment & Plan   (F03.90) Dementia without behavioral disturbance, unspecified dementia type (H)  (primary encounter diagnosis)  Comment: With significant worsening of his symptoms.  We discussed about possible memory unit.  They are hoping to avoid that.  Daughter has checked and they do not have a coverage for PCA but Medicare will cover skilled nursing services recorded by physician.  I will start with the home care order which seems totally appropriate for evaluation and as per home care RN assessment they can get further services.  I will also recommend home care  assessment too.  -Discussed with daughter and wife that due to Medicare guidelines we should consider this video visit as a \"face to face\" visit for home care order.  Plan: HOME CARE NURSING REFERRAL     Fuad Tran MD, MD  [facility]      Video-Visit Details    Type of service: "  Video Visit    Video End Time:8:46 AM    Originating Location (pt. Location): Home    Distant Location (provider location):  [facility]     Platform used for Video Visit: Nella Tran MD, MD

## 2020-05-08 NOTE — TELEPHONE ENCOUNTER
Reason for Call:  Home Health Care    Nub with [facility]  Homecare called regarding (reason for call): verbal orders    Orders are needed for this patient. nursing    PT:     OT:     Skilled Nursing: Delay Nursing Start of care to be done Monday May 11th. This is at patients request    Pt Provider: Dr Lee    Phone Number Homecare Nurse can be reached at: [phone]    Can we leave a detailed message on this number? YES    Phone number patient can be reached at:     Best Time:     Call taken on 5/8/2020 at 10:20 AM by Daniela Hogan

## 2020-05-08 NOTE — TELEPHONE ENCOUNTER
fyi  Verbal ok given to nub to start home care Monday  She said that - Per pt- he is not wanting to start this weekend    Thanks!     Rut Vasquez RN

## 2020-05-11 NOTE — TELEPHONE ENCOUNTER
Reason for Call:  Other    Detailed comments: [facility] is requesting to delay skilled nursing start of care until 5/13/2020. Please assist. Thanks!    Phone Number Patient can be reached at: [phone]    Best Time: Any    Can we leave a detailed message on this number? YES    Call taken on 5/11/2020 at 10:53 AM by Zahra Sapp

## 2020-05-13 NOTE — TELEPHONE ENCOUNTER
Writer called [facility] and gave verbal order for Furosemide discontinuation.    WYATT Hollingsworth BSN, RN

## 2020-05-13 NOTE — TELEPHONE ENCOUNTER
Gave this VO for home care-  Skilled nursing once per week for 2 weeks.  PT eval  OT eval.  Social work eval.  HHA once per week for 1 week and twice per week for 1 week.      They need approval to remove lasix from med list.  PT has been off the med for 2 months. Family thought it was a trial.  Can we get approval to discontinue the med? And take off med list in clinic?  LAUREANO Brunson

## 2020-05-13 NOTE — TELEPHONE ENCOUNTER
Furosemide started for peripheral edema 2/2020.  Okay to stop if not helpful/pt doesn't want to be on it anymore.  Removed from med list.    Nichole Rai, CNP

## 2020-05-13 NOTE — TELEPHONE ENCOUNTER
Reason for Call:  Home Health Care    [facility]uge[facility] with [facility] called regarding (reason for call): verbal orders    Orders are needed for this patient.       Skilled Nursing: To continue with Home Care    Also, pt does NOT take script furosemide (LASIX) 20 MG tablet     It was temporary for 1 month.    Please give verbal orders for [facility] to remove from medication list.      Phone Number Homecare Nurse can be reached at: [phone]    Can we leave a detailed message on this number? YES      Call taken on 5/13/2020 at 12:32 PM by Di White

## 2020-05-19 NOTE — TELEPHONE ENCOUNTER
I called and approved PT: Continue one more time this week, and then two times per week for three weeks.    Vidya Dubose RN

## 2020-05-22 NOTE — TELEPHONE ENCOUNTER
[facility] states they need verbal ok to change visit to Tuesday , said she leaves in 30 minutes.  [phone]

## 2020-05-22 NOTE — TELEPHONE ENCOUNTER
Reason for Call:  Home Health Care    [facility] called regarding (reason for call): verbal orders    Orders are needed for this patient:    OT:  2 visits x 2 weeks    Home Care Giver Education    [employer]      Phone Number Homecare Nurse can be reached at: [phone]    Can we leave a detailed message on this number? YES    Best Time: asap    Call taken on 5/22/2020 at 1:35 PM by Di White

## 2020-05-22 NOTE — TELEPHONE ENCOUNTER
Patient had dental appt. Today. Patient requesting to move home care visit to Tuesday. Verbal ok given to Home care nurse. Home care nurse verbalized understanding.       Thanks! Paulette Jerome RN

## 2020-05-26 NOTE — TELEPHONE ENCOUNTER
Reason for Call:  Other call back    Detailed comments: Home care called and states that family is concerned that Aureliano is not sleeping. They are interested in getting him on a low dosage antidepressant. Home Care states she does not know what pharmacy they would like to use and if there is any other questions to contact his daughter Shena Knight at [phone].      Phone Number Patient can be reached at: Other phone number:  [phone] (This is for home care)  [phone] (This is a for daughter)    Best Time: Any     Can we leave a detailed message on this number? YES    Call taken on 5/26/2020 at 11:36 AM by Mike Recinos

## 2020-05-27 NOTE — TELEPHONE ENCOUNTER
Called pt's daughter, Shena.  She will make a phone visit with PCP.  She will call  clinic to make this appt. (Not available to make the appt right now.)  LAUREANO Brunson

## 2020-05-27 NOTE — TELEPHONE ENCOUNTER
Dr. Lee-Please review.  Writer would recommend virtual visit to address new medication request.    Thank you!  REHANA KirbyN, RN

## 2020-05-28 NOTE — TELEPHONE ENCOUNTER
Reason for Call:  Home Health Care    [facility] called regarding (reason for call): Verbal Orders    Orders are needed for this patient.     Home Health Aid:  1 x  1 week     2 x  2 weeks      Pt Provider: Dr. Lee    Phone Number Homecare Nurse can be reached at: [phone]    Can we leave a detailed message on this number? YES    Call taken on 5/28/2020 at 1:13 PM by Di White

## 2020-05-28 NOTE — TELEPHONE ENCOUNTER
Detailed message left on Nafisa's identified, confidential voicemail authorizing requested home care orders.    REHANA KirbyN, RN

## 2020-06-10 NOTE — PROGRESS NOTES
"Aureliano Knight is a 85 year old male who is being evaluated via a billable video visit.      The patient has been notified of following:     \"This video visit will be conducted via a call between you and your physician/provider. We have found that certain health care needs can be provided without the need for an in-person physical exam.  This service lets us provide the care you need with a video conversation.  If a prescription is necessary we can send it directly to your pharmacy.  If lab work is needed we can place an order for that and you can then stop by our lab to have the test done at a later time.    Video visits are billed at different rates depending on your insurance coverage.  Please reach out to your insurance provider with any questions.    If during the course of the call the physician/provider feels a video visit is not appropriate, you will not be charged for this service.\"    Patient has given verbal consent for Video visit? Yes    How would you like to obtain your AVS? Mail a copy    Patient would like the video invitation sent by: Text to cell phone: [phone]    Will anyone else be joining your video visit? Yes daughter and wife will be present on same call    Subjective     Aureliano Knight is a 85 year old male who presents today via video visit for the following health issues:    HPI    Trouble Sleeping      Duration: 6 months    Description (location/character/radiation): trouble staying asleep, frequent    Intensity:  moderate    Accompanying signs and symptoms: none    History (similar episodes/previous evaluation): None    Precipitating or alleviating factors: Alzheimers    Therapies tried and outcome: nothing, never tried any sleep aids     Talked to daughter and wife. Patient was there during visit.   Patient is doing better with home care help.   He is waking up frequently at night and that is causing her Mom to have some difficulty. Two years ago he was given a " "medication to help with sleeping. They briefly tried OTC melatonin which patient did not tolerate. He is also very anxious. He has also lost weight since moving to [place].       Video Start Time:8:49 am       Reviewed and updated as needed this visit by Provider         Review of Systems   Constitutional, HEENT, cardiovascular, pulmonary, gi and gu systems are negative, except as otherwise noted.      Objective    There were no vitals taken for this visit.  Estimated body mass index is 27.04 kg/m  as calculated from the following:    Height as of 5/6/20: 1.664 m (5' 5.5\").    Weight as of 5/6/20: 74.8 kg (165 lb).  Physical Exam     GENERAL: Healthy, alert and no distress  EYES: Eyes grossly normal to inspection.  No discharge or erythema, or obvious scleral/conjunctival abnormalities.  RESP: No audible wheeze, cough, or visible cyanosis.  No visible retractions or increased work of breathing.    SKIN: Visible skin clear. No significant rash, abnormal pigmentation or lesions.  NEURO: Cranial nerves grossly intact.  Mentation and speech appropriate for age.  PSYCH: Mentation appears normal, affect normal/bright, judgement and insight intact, normal speech and appearance well-groomed.      Diagnostic Test Results:  Labs reviewed in Epic        Assessment & Plan       1. Insomnia, unspecified type  - Discussed trial of Remeron 7.5 mg at bedtime to help with below symptoms. Patient agreeable to plan. They also needed paperwork notarized and filled out. I recommended to come to  clinic on 06/12/20 at 1 pm, in order for me to fill out forms and have them notarized.   - mirtazapine (REMERON) 7.5 MG tablet; Take 1 tablet (7.5 mg) by mouth At Bedtime  Dispense: 30 tablet; Refill: 1    2. Appetite impaired  - mirtazapine (REMERON) 7.5 MG tablet; Take 1 tablet (7.5 mg) by mouth At Bedtime  Dispense: 30 tablet; Refill: 1    3. Anxiety  - mirtazapine (REMERON) 7.5 MG tablet; Take 1 tablet (7.5 mg) by mouth At Bedtime  Dispense: 30 " tablet; Refill: 1    Jean Paul Lee MD  [facility]      Video-Visit Details    Type of service:  Video Visit    Video End Time: 8:59 am     Originating Location (pt. Location): Home    Distant Location (provider location):  [facility]     Platform used for Video Visit: Well    No follow-ups on file.       Jean Paul Lee MD

## 2020-06-10 NOTE — LETTER
June 12, 2020          To Whom It May Concern,     Aureliano Knight is a patient of mine at the [facility] who was recently diagnosed with dementia. As part of his dementia his handwriting has become altered which has changed his signature. Please call me if you have any questions or concerns.       Sincerely,        Jean Paul Lee MD

## 2020-06-20 NOTE — ED NOTES
Spouse called and asked if her  can talk right now.  Patient asleep.    SPOUSE's NAME is  Wilma  CALL HER ANYTIME if her ELENA wakes up as she wants to speak with him.

## 2020-06-20 NOTE — PHARMACY-ADMISSION MEDICATION HISTORY
Admission medication history interview status for the 6/20/2020 admission is complete. See Epic admission navigator for allergy information, pharmacy, prior to admission medications and immunization status.     Medication history interview sources:  Patient's Wife (Wilma), Krystal    Changes made to PTA medication list (reason)  Added: N/A  Deleted:   1.) mirtazapine 7.5mg tablet: take 1 tablet by mouth at bedtime. Patient's wife reported patient never began therapy.   Changed: N/A    Additional medication history information (including reliability of information, actions taken by pharmacist):  1.) Patient's wife manages his medications; she is a great historian. She reported that the patient takes no routine prescribed or OTC medications nor does he take any vitamins or herbal supplements.       Prior to Admission medications    Not on File         Medication history completed by: Sean Harrison PD3 Pharmacy Intern

## 2020-06-20 NOTE — ED PROVIDER NOTES
ED Provider Note  [facility]      History     Chief Complaint   Patient presents with     Fall     BIB EMS from home. Hx dementia, lives with wife, who is primary caregiver. Per EMS, pt does not take any medications. Pt c/o bilateral hip pain.     HPI  Aureliano Knight is a 85 year old male with a history of dementia who lives independently with wife who is a primary caregiver who was brought in by ambulance secondary to recurrent falls.  History is obtained initially from thirdhand information which includes patient having had change in usual level of confusion over the past 2 to 3 days.  He has had several falls out of bed as well as while walking over the past 2 days.  There is been no reported injury.   Ancillary history is obtained from the daughter.  Patient apparently has had progressive dementia over the past 2 years.  At baseline, he uses a walker and until the past 2 days was able to get up and go to the bathroom independently.  He has had progressive decline in memory but clear speech until the past 2 days at which time it was noted that he was rambling more than usual.  As well, it was noted that with the falls, he has become incontinent and has been wearing a diaper the past 2 nights.  The daughter states that he fell this morning, yesterday afternoon and yesterday morning without apparent injury other than skin tears to bilateral elbows.  Also per daughter's report, both she and her mother/client's wife have been looking into nursing homes recently including a nursing home called [facility] approximately 2 blocks from the house.  The mother and daughter had contacted a hospice approximately a month ago but were declined.  At the time, they stated if anything changes, hospice should be recontacted.  He apparently was sent in this morning secondary to recurrent falls and the feeling that he was no longer safe at home.  Both daughter and mother are in agreement that he would be  best served in a nursing home.  From past medical records, patient recently completed and at home rehabilitation assignment 10 days ago.      Past Medical History  Past Medical History:   Diagnosis Date     Dementia (H)      History reviewed. No pertinent surgical history.  mirtazapine (REMERON) 7.5 MG tablet      Allergies   Allergen Reactions     Penicillins      Past medical history, past surgical history, medications, and allergies were reviewed with the patient. Additional pertinent items: None    Family History  History reviewed. No pertinent family history.  Family history was reviewed with the patient. Additional pertinent items: None    Social History  Social History     Tobacco Use     Smoking status: Never Smoker     Smokeless tobacco: Never Used   Substance Use Topics     Alcohol use: Yes     Drug use: Yes     Types: Marijuana      Social history was reviewed with the patient. Additional pertinent items: None    Review of Systems   Constitutional: Negative for fever.   HENT: Negative for congestion.    Eyes: Negative for redness.   Respiratory: Negative for shortness of breath.    Cardiovascular: Negative for chest pain.   Gastrointestinal: Negative for abdominal pain.   Genitourinary: Negative for difficulty urinating.   Musculoskeletal: Negative for arthralgias and neck stiffness.   Skin: Negative for color change.   Neurological: Positive for speech difficulty. Negative for headaches.   Psychiatric/Behavioral: Negative for confusion.     A complete review of systems was performed with pertinent positives and negatives noted in the HPI, and all other systems negative.    Physical Exam   BP: 133/66  Heart Rate: 54  Temp: 97.9  F (36.6  C)  Resp: 14  SpO2: 99 %  Physical Exam  Constitutional:       General: He is not in acute distress.     Appearance: He is not diaphoretic.   HENT:      Head: Atraumatic.   Eyes:      General: No visual field deficit or scleral icterus.     Pupils: Pupils are equal,  round, and reactive to light.   Cardiovascular:      Heart sounds: Normal heart sounds.   Pulmonary:      Effort: No respiratory distress.      Breath sounds: Normal breath sounds.   Abdominal:      General: Bowel sounds are normal.      Palpations: Abdomen is soft.      Tenderness: There is no abdominal tenderness.   Musculoskeletal:         General: No tenderness.   Skin:     General: Skin is warm.      Findings: No rash.   Neurological:      Mental Status: He is alert.      GCS: GCS eye subscore is 4. GCS verbal subscore is 5. GCS motor subscore is 6.      Cranial Nerves: No cranial nerve deficit, dysarthria or facial asymmetry.      Motor: No weakness, tremor, atrophy, abnormal muscle tone or pronator drift.      Coordination: Coordination is intact.     Bilateral lower leg swelling    ED Course      Procedures             Results for orders placed or performed during the hospital encounter of 06/20/20   XR Pelvis 1/2 Views     Status: None    Narrative    XR PELVIS 1/2 VW 6/20/2020 9:28 AM     HISTORY: fall,      Impression    IMPRESSION: No apparent pelvic or proximal femoral fracture.    LILLIAN LARA MD   Head CT w/o contrast     Status: None    Narrative    CT SCAN OF THE HEAD WITHOUT CONTRAST   6/20/2020 9:37 AM     HISTORY: Head trauma, with injury and pain.    TECHNIQUE:  Axial images of the head and coronal reformations without  IV contrast material. Radiation dose for this scan was reduced using  automated exposure control, adjustment of the mA and/or kV according  to patient size, or iterative reconstruction technique.    COMPARISON: MRI 2/19/2020    FINDINGS: There is no evidence of intracranial hemorrhage, mass, acute  infarct or anomaly. Mild/moderate generalized prominence of the  ventricular system and sulci, with prominence of extra-axial fluid  over the frontal lobes bilaterally which is evidence for mild to  moderate generalized brain parenchymal volume loss with a preferential  frontal lobe  component. 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    There is mild scattered paranasal sinus membrane thickening. Mastoids  are essentially clear. The bony calvarium and bones of the skull base  appear intact.       Impression    IMPRESSION:     1. No acute intracranial pathology, no acute fractures.  2. Moderate age-related changes.     ROB CHIRINOS MD   CBC with platelets differential     Status: Abnormal   Result Value Ref Range    WBC 10.2 4.0 - 11.0 10e9/L    RBC Count 3.91 (L) 4.4 - 5.9 10e12/L    Hemoglobin 11.4 (L) 13.3 - 17.7 g/dL    Hematocrit 35.8 (L) 40.0 - 53.0 %    MCV 92 78 - 100 fl    MCH 29.2 26.5 - 33.0 pg    MCHC 31.8 31.5 - 36.5 g/dL    RDW 19.0 (H) 10.0 - 15.0 %    Platelet Count 310 150 - 450 10e9/L    Diff Method Automated Method     % Neutrophils 72.5 %    % Lymphocytes 16.1 %    % Monocytes 9.8 %    % Eosinophils 0.7 %    % Basophils 0.5 %    % Immature Granulocytes 0.4 %    Nucleated RBCs 0 0 /100    Absolute Neutrophil 7.4 1.6 - 8.3 10e9/L    Absolute Lymphocytes 1.7 0.8 - 5.3 10e9/L    Absolute Monocytes 1.0 0.0 - 1.3 10e9/L    Absolute Eosinophils 0.1 0.0 - 0.7 10e9/L    Absolute Basophils 0.1 0.0 - 0.2 10e9/L    Abs Immature Granulocytes 0.0 0 - 0.4 10e9/L    Absolute Nucleated RBC 0.0    Comprehensive metabolic panel     Status: Abnormal   Result Value Ref Range    Sodium 143 133 - 144 mmol/L    Potassium 3.9 3.4 - 5.3 mmol/L    Chloride 110 (H) 94 - 109 mmol/L    Carbon Dioxide 30 20 - 32 mmol/L    Anion Gap 3 3 - 14 mmol/L    Glucose 87 70 - 99 mg/dL    Urea Nitrogen 17 7 - 30 mg/dL    Creatinine 0.75 0.66 - 1.25 mg/dL    GFR Estimate 83 >60 mL/min/[1.73_m2]    GFR Estimate If Black >90 >60 mL/min/[1.73_m2]    Calcium 8.9 8.5 - 10.1 mg/dL    Bilirubin Total 1.6 (H) 0.2 - 1.3 mg/dL    Albumin 3.5 3.4 - 5.0 g/dL    Protein  Total 7.2 6.8 - 8.8 g/dL    Alkaline Phosphatase 73 40 - 150 U/L    ALT 22 0 - 70 U/L    AST 46 (H) 0 - 45 U/L   UA reflex to Microscopic and Culture     Status: Abnormal    Specimen: Urine clean catch; Unspecified Urine   Result Value Ref Range    Color Urine Yellow     Appearance Urine Clear     Glucose Urine Negative NEG^Negative mg/dL    Bilirubin Urine Negative NEG^Negative    Ketones Urine 10 (A) NEG^Negative mg/dL    Specific Gravity Urine 1.019 1.003 - 1.035    Blood Urine Trace (A) NEG^Negative    pH Urine 6.0 5.0 - 7.0 pH    Protein Albumin Urine 10 (A) NEG^Negative mg/dL    Urobilinogen mg/dL Normal 0.0 - 2.0 mg/dL    Nitrite Urine Negative NEG^Negative    Leukocyte Esterase Urine Negative NEG^Negative    Source Unspecified Urine     RBC Urine <1 0 - 2 /HPF    WBC Urine 2 0 - 5 /HPF    Mucous Urine Present (A) NEG^Negative /LPF   CRP inflammation     Status: None   Result Value Ref Range    CRP Inflammation 5.2 0.0 - 8.0 mg/L   TSH     Status: None   Result Value Ref Range    TSH 2.87 0.40 - 4.00 mU/L     Medications   acetaminophen (TYLENOL) tablet 650 mg (650 mg Oral Given 6/20/20 0959)        Assessments & Plan (with Medical Decision Making)   85-year-old male with several year history of dementia who presents for evaluation of recurrent frequent falls, neck pain and urinary incontinence over the past 2 to 3 days.  Exam revealed significant memory issues with inability to identify place, time, wife's name, season, birth year.  Further exam revealed no focal neuro deficits and bilateral lower extremity swelling.  Work-up included CBC, comprehensive metabolic panel, urinalysis, CRP and TSH were all grossly unremarkable.  CT of the head and neck revealed no acute intracranial or other pathology.  Pelvic x-ray revealed no evidence of fracture.  The case was discussed at length with  as well as patient's family and admitting internal medicine physician.  Patient will be admitted to observation  on internal medicine for hospice consult, further social work intervention and other treatment as deemed appropriate.    I have reviewed the nursing notes. I have reviewed the findings, diagnosis, plan and need for follow up with the patient.    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

## 2020-06-20 NOTE — LETTER
Transition Communication Hand-off for Care Transitions to Next Level of Care Provider    Name: Aureliano Knight  : 1934  MRN #: [id number]  Primary Care Provider: Jean Paul Lee     Primary Clinic: [address]  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) [G31.09, F02.80]  Admit Date/Time: 2020  6:58 AM  Discharge Date: 2020 5:00PM  Payor Source: Payor: MEDICARE / Plan: MEDICARE / Product Type: Medicare /          Reason for Communication Hand-off Referral: Other continuity of care    Discharge Plan: Home with [facility]       Concern for non-adherence with plan of care:   NO    Future Appointments   Date Time Provider Department Center   2020  7:00 PM UR OT OVERFLOW UROT MARY Feliz  [address] 5/[address] 8 Ortho/Med/Surg & [facility]  Phone: [phone] Pager: [phone]                AVS/Discharge Summary is the source of truth; this is a helpful guide for improved communication of patient story

## 2020-06-20 NOTE — LETTER
Health Information Management Services               Recipient: [facility]          Sender: MARY Arauz [phone]          Date: June 22, 2020  Patient Name:  Aureliano Knight  Routing Message:  Discharge orders, 5pm ride time          The documents accompanying this notice contain confidential information belonging to the sender.  This information is intended only for the use of the individual or entity named above.  The authorized recipient of this information is prohibited from disclosing this information to any other party and is required to destroy the information after its stated need has been fulfilled, unless otherwise required by state law.      If you are not the intended recipient, you are hereby notified that any disclosure, copy, distribution or action taken in reliance on the contents of these documents is strictly prohibited.  If you have received this document in error, please return it by fax to [phone] with a note on the cover sheet explaining why you are returning it (e.g. not your patient, not your provider, etc.).  If you need further assistance, please call [facility] Centralized Transcription at [phone].  Documents may also be returned by mail to [employer], , [facility] [address], [place], [place] [address].

## 2020-06-20 NOTE — PROGRESS NOTES
"Social Work Services Progress Note    Hospital Day: 1  Date of Initial Social Work Evaluation:  Not completed yet  Consulted with:  Daughter Shena [phone]    Data:  Patient is an 85 year old male who is admitted (per chart) \"with a history of dementia who lives independently with wife who is a primary caregiver who was brought in by ambulance secondary to recurrent falls.  History is obtained initially from  information which includes patient having had change in usual level of confusion over the past 2 to 3 days.  He has had several falls out of bed as well as while walking over the past 2 days.  There is been no reported injury.\"    Intervention:  Spoke with patient's daughter Shena - she endorsed that patient was NOT at TCU - instead he was receiving Homecare services from [facility] (RN, PT, OT, HHA, and SW) for 6 weeks - starting 2 months ago and stopping 2 weeks ago. Shena and her mother have seen a significant decline in the last 72 hours. He has had 3 falls, is \"much foggier\" mentally, and is having difficulty ambulating (he is \"wobbly\") and his ability to ambulate is waxing and waning.  Patient's wife is able to cook and care for the condo - it is on 1 floor/no stairs and there are 2 bathrooms. Daughter feels that her mother is \"exhausted\" with caring for her . The family also has a significant concern about having him in a care facility where they would not be able to visit due to COVID19 visitor restriction policies. We talked about the new guidelines released by the [place] Department of Health on Wednesday 6.17.2020 around outside visitor policy changes for congregate living settings.     HCD - Patient's current directive in Epic is invalid. Daughter shared that they had the document signature notarized on a new document at the [facility] 2 weeks ago and thought clinic staff would be scanning into Epic. Daughter will call [facility] on Monday 6/22/20 to see " where the document is. Patient's wife has the original of the document at home. Writer indicated that they could provide a copy to [facility]. Paul - Daughter spoke with manager on Friday 6.19.20 about the possibility of her dad being a resident there. They have 2 openings and the cost would be approximately $7,000 per month ($4,000 for room and $3,000 for care). On a phone call today staff at the [facility] told daughter that there are currently NO visitors allowed in their facility.     Hospice - Daughter is not sure if this is what her mother would like to do. Daughter is concerned that her mother will make a decision that she regrets on where patient would be receiving care. [facility] (Rick @ [phone]) will call patient's daughter tomorrow to have further conversation.     Hourly/Awake HHA Care at Home -  We talked about the option of having hospice at home along with privately hiring care (HHA) for 12 hours overnight 1900 - 0700 to allow wife and daughter to sleep and know that patient is being care for.  Daughter is not sure how comfortable her mother would be to have a stranger staying overnight in their home - they will have further conversation about this. Writer found 3 agencies that were provided to daughter who do this type of service:    1) [facility] @ [phone] (cost $34-37/hour depending on assessment of care needs)  2) [facility] @ [phone] ($36/hour attendant; $200 start up fee; would strive to have 3-4 caregivers per week; could start as early as Sunday 6/21/20)  3) [facility] @ [phone] (no answer; will need to call on Monday 6/22/20)     Updated daughter to above information.     Assessment:  Patient is requiring more care at home - need to decide if additional care can be/should be brought into home OR if he should be in a facility but not be able to have visitors    Plan:    Anticipated Disposition: Facility vs Home    Barriers to  d/c plan:  Determining a safe discharge plan, daughter's ability to talk with Pedro on Monday 6/22/20, family deciding how they want to proceed, setting up hospice if they decide for this option, setting up 12 hour care overnight if they decide for this option, and coordinating discharge      Follow Up:      On Sunday 6/21/20:  1) [facility] at [phone] will call daughter Shena to further discuss hospice at home    On Monday 6/22/20 :  1) Daughter Shena to speak with [facility] manager about possibility of patient admitting there, could they have a shorter lease if they decide not to keep him there, are they changing their visitor restrictions based on new guidelines from Department of Health last Wednesday, and when he could be admitted to facility.  2) Daughter to engage services of private duty HHA from list above if they decide to pursue this route.     GENNA Pena MSW   6/20/2020 Saturday ON CALL PAGER   0800 - 1600   [phone]    ON CALL COVERAGE AFTER 1600  [phone]    Sunday ON CALL PAGER 0800 - 1600  [phone]    ON CALL COVERAGE AFTER 1600  [phone]

## 2020-06-20 NOTE — PROGRESS NOTES
"Social Work Services Progress Note    Hospital Day: 1  Date of Initial Social Work Evaluation:  Not completed  Consulted with:  ED MD and patient's wife    Data:  Patient is an 85 year old male who is admitted (per chart) \"with a history of dementia who lives independently with wife who is a primary caregiver who was brought in by ambulance secondary to recurrent falls.  History is obtained initially from  information which includes patient having had change in usual level of confusion over the past 2 to 3 days.  He has had several falls out of bed as well as while walking over the past 2 days.  There is been no reported injury.\"    Intervention:  Received call from ED MD with request to speak with family about care for patient. Patient was recently in a TCU for rehab and returned home. Over the past 2-3 days he has had multiple falls, incontinence and more confusion. Family had been looking at [facility] in [place] (it is 2 blocks from their home) but they do not allow visitors. Family/patient had a hospice consult recently but the hospice program did not feel he met the eligibility criteria for hospice. They shared that family should call back should things change. MD asked that writer contact gypsy Chatterjee @ [phone]. Attempted 2 times without success - 2 messages left. Reached out to wife Wilma @ [phone] and she reported that her daughter and she were up all night. Her daughter told her mother that she was going to go home, shut off her phone, and nap for 2 hours. Wilma believes that her daughter will be awake at 1500. Writer will call back then.    Spoke with  hospice [phone] and [facility] [phone] to see if either of these companies had spoken with patient/family about hospice. [facility] stated that they spoke with gypsy Chatterjee on 5.18.20. They will plan to reach out to daughter again tomorrow AM about hospice care.     Assessment:  Patient " with increased care needs and requiring 24/7 care at home    Plan:    Anticipated Disposition:  Home with hospice and 24/7 care VS placement in a facility with hospice care    Barriers to d/c plan:  Safe discharge plan, enough care at home and hospice plan.     Follow Up:  Will await conversation with daughter to find out more information.    GENNA Pena MSW   6/20/2020 Saturday ON CALL PAGER   0800 - 1600   [phone]    ON CALL COVERAGE AFTER 1600  [phone]    Sunday ON CALL PAGER 0800 - 1600 [phone]

## 2020-06-20 NOTE — PROGRESS NOTES
Medicine Triage contacted via [facility] by ED provider and patient discussed with Dr. Rose this afternoon. Pt is in [facility] ED. Dr. Rose is working with SW and Pt's family to see if it is possible to set up a safe discharge plan for the Pt from the ED. If safe discharge plan is not possible, plan is for ED provider to re-call [facility] to request Obs admission for patient (likely on [facility]).     Pt will not be admitted to medicine until ED provider calls [facility] and connects with Medicine Triage MD on call, this was discussed with Dr. Rose.     Kerry Joe MD

## 2020-06-21 NOTE — PROGRESS NOTES
[facility], [facility] Progress Note - Hospitalist Service       Date of Admission:  6/20/2020  Assessment & Plan         Aureliano Knight is a 85 year old male  with Hx of dementia. Wife believes he had dementia for 7 years, but believes that its been progressively getting worse in last two years. He was brought in due to frequent falls, he not being himself, failing to thrive.  On enquiry he got mirtazapine 7.5 mg hs for two days before the falls and confusion. No history of other medical problems.     Plan 6/21  Palliative consult in am   SW      # Failure to thrive: 85 M with advanced dementia. Dementia has been progressive. He got mirtazapine for two days which may have caused acute worsening and confusion and falls. NO UTI noted. No other obvious infection noted. CT head is negative. His dementia is progressively getting worse. He is dependent on care by family. His functional capacity is low.   Palliatve cares  Social work   I think he is candidate for home hospice. That's what family wants. He is DNR and DNI.   -Stopped mirtazapine.  -Sitter  -Fall precautions    # Dementia: Not on any medications. Has been advancing.     # Suspect Hypertension : Diastolic BP was on high side on admission. Monitor BP. Target SBP for him is 150-160.     # Ankle Edema: BL ankle edema noted. Its been chronic for years. Likely venous stasis. Monitor. Can do stockings, but he has done well for so long. It may not change much for him.        Broussard Catheter: not present  Code Status: DNR/DNI           Disposition Plan   Expected discharge: TBD, recommended to long term vs hospice care ? once safe disposition plan/ TCU bed available.  Entered: Spring Flores MD 06/21/2020, 7:32 AM       The patient's care was discussed with the Bedside Nurse and Patient's Family.    Spring Flores MD  Hospitalist Service  [facility],  [place]    ______________________________________________________________________    Interval History   Brought in by family for worsening confusion and inability to take care at home     Data reviewed today: I reviewed all medications, new labs and imaging results over the last 24 hours.    Physical Exam   Vital Signs: Temp: 97.6  F (36.4  C) Temp src: Oral BP: (!) 166/93 Pulse: 77   Resp: 16 SpO2: 100 % O2 Device: None (Room air)    Weight: 0 lbs 0 oz  Hematologic / Lymphatic: no cervical lymphadenopathy  Respiratory: No increased work of breathing, good air exchange, clear to auscultation bilaterally, no crackles or wheezing  Cardiovascular: Normal apical impulse, regular rate and rhythm, normal S1 and S2, no S3 or S4, and no murmur noted  GI: No scars, normal bowel sounds, soft, non-distended, non-tender, no masses palpated, no hepatosplenomegally    Data   Recent Labs   Lab 06/21/20  0737 06/20/20  0749   WBC 11.5* 10.2   HGB 12.3* 11.4*   MCV 91 92    310    143   POTASSIUM 3.6 3.9   CHLORIDE 109 110*   CO2 24 30   BUN 13 17   CR 0.66 0.75   ANIONGAP 8 3   BRIANNA 8.8 8.9   GLC 73 87   ALBUMIN  --  3.5   PROTTOTAL  --  7.2   BILITOTAL  --  1.6*   ALKPHOS  --  73   ALT  --  22   AST  --  46*

## 2020-06-21 NOTE — PROGRESS NOTES
Pt arrived to unit from ED at 1800. Ttegaderm over skin tears on R&L extremeties, tears leaking scant serous fluid. Significant scattered ecchymosis. L knee bruising. Scab on nose. Skin is very thin; tears/bruising from recent falls. CMS+ ex bilat edema in feet/ankles +2. PIV LUE wrapped in coban.Pt compliant with requests at first, slowly pivot transferring AO2 to bed from cart. Pt quickly became loud, calling out for his mom. Orthostatic BPs unsuccessful as pt was agitated. He did not know what day it was and thought he was in [place], not [place]. Pt began to call out for his mother, sometimes his wife, Fredy. He denied hallucinations but told this RN people were talking about him. Pt seems afraid. Pt attempted to get OOB  without calling for assistance (stating he wanted to leave) @1845, MD notified. RN tried to console pt and offer him food, but pt is refusing anything PO at this time. Order for bedside attendant obtained and implemented. Pt stood up and RN and NST tried to get pt back to bed but he kept yelling out. When this RN and the NST got the pt back to bed he was hitting and kicking at staff. MD notified as pt will not calm down. Awaiting response.     Addendum:  2136: EKG completed, NS bolus infusing. Notify MD if pt escalates. Currently pt appears comfortable, resting in bed. Pt remains disoriented to time/place/situation. No BM. +gas. Incont' of bladder per ED report, wears attends. VSS. Needs retake for orthostatics: pt refused to stand or sit when staff asked. Continue to monitor, sitter at bedside.     OBS goals. ALL NOT MET: 1800, 2000, 2200   -diagnostic tests and consults completed and resulted consults to be met  -vital signs normal or at patient baseline pt needs orthostatic BPs retaken. Pt would not comply this evening.   -tolerating oral intake to maintain hydration pt ate <10% food ordered  -safe disposition plan has been identified not met

## 2020-06-21 NOTE — PLAN OF CARE
VS: /77   Pulse 73   Temp 98.1  F (36.7  C) (Oral)   Resp 16   SpO2 100%    O2: Pt on room air. No s/s of SoB   Output: Pt was continent of bladder x1 this shift. Voided 150 in urinal with staff assistance for placement.    Last BM: LBM: 6/21/20. Incontinent of medium BM   Activity: Transfers ax2 w/ gait belt. Remained in bed overnight.    Skin: Pt has scattered bruising. Skin tears on (R) arm and elbow. Tegaderm applied, but Pt picks off. Skin tear on (L) elbow.    Skin tear's are draining serosanguinous   Pain: Writer asked if Pt was in pain. Pt rambled on and then said yes, pain in elbow. Writer grabbed PRN tylenol and offered to Pt. Pt stated he wasn't in pain and refused to take medication. Will continue to monitor.    CMS: Pt is confused. Oriented to self only. Disoriented to time, place, and situation. Pt is re-directable at times. Follows commands inconstantly.    Dressing: Pt has been moving in bed and removing dressings.   Diet: Regular diet. Poor appetite per evening nurse. Pt offered fluids overnight. Pt declined.    LDA: PIV in (L) AC. Currently infusing NS @ 100mL/hr. Pt keeps picking at IV site. IV wrapped in coban.    Equipment: IV pole, IV pump. Sitter at bedside.    Plan: Continue with POC. Document on observation goals.    Additional Info:       Observation goals:    0300  -diagnostic tests and consults completed and resulted - Not met  -vital signs normal or at patient baseline - Not met  -tolerating oral intake to maintain hydration - Not met  -safe disposition plan has been identified- Not met    0700  -diagnostic tests and consults completed and resulted - Not met  -vital signs normal or at patient baseline - not met  -tolerating oral intake to maintain hydration - Not met  -safe disposition plan has been identified- Not met

## 2020-06-21 NOTE — UTILIZATION REVIEW
[facility] Utilization Review  Admission Status; Secondary Review Determination     Admission Date: 6/20/2020  6:58 AM      Under the authority of the Utilization Management Committee, the utilization review process indicated a secondary review on the above patient.  The review outcome is based on review of the medical records, discussions with staff, and applying clinical experience noted on the date of the review.        (X)      Inpatient Status Appropriate - This patient's medical care is consistent with medical management for inpatient care and reasonable inpatient medical practice.          RATIONALE FOR DETERMINATION   Aureliano Knight is a 85 year old male with complex past medical history including dementia, who is registered to observation with frequent falls, confusion, failure to thrive.  Change in clinical status is attributed to worsening dementia, medication effects.  He continues to be symptomatic with declining status, poor oral intake, and is requiring supportive cares, 1:1 sitter and in light of poor prognosis palliative/hospice consultation is requested.  Cares are rendered complex due to advanced age, end-stage dementia, failure to thrive and poor prognosis.  Anticipated length of stay is more than 2 midnights for above-mentioned cares and need for ongoing management and safe disposition planning.  Criteria for inpatient admission is met.  Recommendation is communicated to the primary team, Dr. Flores.           The definitions of Inpatient Status and Observation Status used in making the determination above are those provided in the CMS Coverage Manual, Chapter 1 and Chapter 6, section 70.4.      Sincerely,       Karen Oliva MD, MS  Physician Advisor  Utilization Review-[place]    Phone: [phone]

## 2020-06-21 NOTE — H&P
[facility], [place]    History and Physical  Hospitalist       Date of Admission:  6/20/2020  Date of Service (when I saw the patient): 06/20/20    Assessment & Plan     Aureliano Knight is a 85 year old male  with Hx of dementia. Wife believes he had dementia for 7 years, but believes that its been progressively getting worse in last two years. He was brought in due to frequent falls, he not being himself, failing to thrive.  On enquiry he got mirtazapine 7.5 mg hs for two days before the falls and confusion. NO history of other medical problems.     # Failure to thrive: 85 M with advanced dementia. Dementia has been progressive. He got mirtazapine for two days which may have caused acute worsening and confusion and falls. NO UTI noted. No other obvious infection noted. CT head is negative. His dementia is progressively getting worse. He is dependent on care by family. His functional capacity is low.   -Consult palliative care tomorrow. I think he is candidate for home hospice. That's what family wants. He is DNR and DNI.   -Stop mirtazapine.  -give one liter of IVF  -PT, OT to see to assess and document functional capacity  And balance.   -Sitter  -Fall precautions  -check orthostatic vitals.   # Dementia: Not on any medications. Has been advancing.   # Suspect Ht: Diastolic BP was on high side on admission. Monitor BP. Target SBP for him is 150-160.   # Ankle Edema: BL ankle edema noted. Its been chronic for years. Likely venous stasis. Monitor. Can do stockings, but he has done well for so long. It may not change much for him.     DVT Prophylaxis: SCDs  Code Status: DNI, DNR. I spoke with Wife and it was confirmed.   Disposition:    Simone Sotelo MD    Primary Care Physician   Jean Paul Lee    Chief Complaint       History of Present Illness      Aureliano Knight is a 85 year old male  with Hx of dementia. Wife believes he had dementia for 7 years, but believes  that its been progressively getting worse in last two years. He lived in [place] with wife for 25 years.   They have been  for 65 years. They moved to [place] less than a year to be close to daughter.     In last couple of days, he fell three times. 911 was called and he was brought to the hospital. His skin is thin and frail. He has a lot of bruising on his hands.  Last few days, not he is not making sense. He has been babbling. He is consumed by fear. Someone is going to hurt the person itself. He hallucinates at times. He sees nicely dressed or interestingly dressed people.  He has not been able to walk well for two days    At times, He has incontinence. At times, he has incontinence with bowel. He has been eating and drinking okay on his own. He can not shower himself. He can brush his teeth with assistance. He needs to be wiped after BM's.     No fever or chills. No cough. No sob.     He has ankle swelling, going on for two years.     On further enquiry, his wife started him on mirtazapine two nights ago. It was prescribed.     They have been looking at NH for placement. They are interested hospice. Has been evaluated by them before, but he was declined.       Past Medical History      I have reviewed this patient's medical history and updated it with pertinent information if needed.   Past Medical History:   Diagnosis Date     Dementia (H)        Past Surgical History      I have reviewed this patient's surgical history and updated it with pertinent information if needed.  History reviewed. No pertinent surgical history.    Prior to Admission Medications   None     Allergies   Allergies   Allergen Reactions     Penicillins        Social History   I have reviewed this patient's social history and updated it with pertinent information if needed. Aureliano Knight  reports that he has never smoked. He has never used smokeless tobacco. He reports current alcohol use. He reports current drug use. Drug:  Marijuana.    Family History   I have reviewed this patient's family history and updated it with pertinent information if needed.   History reviewed. No pertinent family history.    Review of Systems   The 10 point Review of Systems is negative other than noted in the HPI or here.     Physical Exam   Temp: 97.9  F (36.6  C) Temp src: Oral BP: (!) 165/74 Pulse: 71 Heart Rate: 54 Resp: 16 SpO2: 100 % O2 Device: None (Room air)    Vital Signs with Ranges  Temp:  [97.9  F (36.6  C)] 97.9  F (36.6  C)  Pulse:  [71] 71  Heart Rate:  [54] 54  Resp:  [14-16] 16  BP: (133-165)/(66-74) 165/74  SpO2:  [99 %-100 %] 100 %  0 lbs 0 oz    Constitutional: looks old and frail. Lots of bruising on elbows on both sides  Eyes: Conjunctiva and pupils examined and normal.  HEENT: oral mucosa dry  Respiratory: Clear to auscultation bilaterally, no crackles or wheezing.  Cardiovascular:  S1S2 heard. Soft Systolic murmur noted in the apical  area  GI: Soft, non-distended, non-tender, normal bowel sounds.  Lymph/Hematologic: No anterior cervical or supraclavicular adenopathy.  Skin: BL ankle edema noted, left elbow skin sloughed off, there are multiple bruising areas on elbows and some on legs.   Musculoskeletal: No joint swelling, erythema or tenderness.  Neurologic: moving hands and legs, non focal, dementia  Psychiatric:  Dementia.     Data   Data reviewed today:      EKG: pending.   Imaging:     Recent Labs   Lab 06/20/20  0749   WBC 10.2   HGB 11.4*   MCV 92         POTASSIUM 3.9   CHLORIDE 110*   CO2 30   BUN 17   CR 0.75   ANIONGAP 3   BRIANNA 8.9   GLC 87   ALBUMIN 3.5   PROTTOTAL 7.2   BILITOTAL 1.6*   ALKPHOS 73   ALT 22   AST 46*       Recent Results (from the past 24 hour(s))   XR Pelvis 1/2 Views    Narrative    XR PELVIS 1/2 VW 6/20/2020 9:28 AM     HISTORY: fall,      Impression    IMPRESSION: No apparent pelvic or proximal femoral fracture.    LILLIAN LARA MD   Head CT w/o contrast    Narrative    CT SCAN OF THE  HEAD WITHOUT CONTRAST   6/20/2020 9:37 AM     HISTORY: Head trauma, with injury and pain.    TECHNIQUE:  Axial images of the head and coronal reformations without  IV contrast material. Radiation dose for this scan was reduced using  automated exposure control, adjustment of the mA and/or kV according  to patient size, or iterative reconstruction technique.    COMPARISON: MRI 2/19/2020    FINDINGS: There is no evidence of intracranial hemorrhage, mass, acute  infarct or anomaly. Mild/moderate generalized prominence of the  ventricular system and sulci, with prominence of extra-axial fluid  over the frontal lobes bilaterally which is evidence for mild to  moderate generalized brain parenchymal volume loss with a preferential  frontal lobe component. 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    There is mild scattered paranasal sinus membrane thickening. Mastoids  are essentially clear. The bony calvarium and bones of the skull base  appear intact.       Impression    IMPRESSION:     1. No acute intracranial pathology, no acute fractures.  2. Moderate age-related changes.     ROB CHIRINOS MD

## 2020-06-21 NOTE — PLAN OF CARE
Nursing   AMS weakness, hx falls  dementia  S-0900- Pt generally pleasant, rare time of striking out.  Pt confused.  Occas can talk about past stories, asking to go find his mother.  And occas other people. Freq rambling words.    Needs freq step by step cues to do things,   meal set up, could feed self some with oversite  ate small amt  did take juice at brfst, taking sips between offering freq,  minimal at lunch.    Uo ok- had IVFF overnite.    1100- Up in chair for lunch- took a long time to help him take few steps to chair and later to commode.  Needed much careful slow direction and patience      More confused when up     13-Pt had asked to use the 'facilities'  assisted to stand but took long time to get him to sit on commode.  Pt had voided already as he stood.  Pt did get irritable- used walker to shake and pound the floor    1430-Dtr called- said hospice has accepted pt   he is pilo to [facility] home tomorrow.  Talked along time about how much care he is needing here.  Nakita wants to try at home.  Wheelchiar is coming tomorrow. Encouraged to get commode.  Enc to get home aid set up already- it would be a lot for wife and dtr who is just there part of day.   SW sent message to help arrange transportation home- would be best to have a wheelchair ride vs family trying to get him in a car would likely not work  Dr Flores informed keerthi plan to have him come home tomorrow and his high care needs.     A- poor po intake, very weak- needs assist of 2  confused   R- be calm and reassuring   offer freq sips po water  enc flids.  Enc chair tid  gait belt and walker and assist 2  Skin tears- scant amt bleeding on sheets- pt picks at any drsgs      .    Observation goals  PRIOR TO DISCHARGE      Comments: -diagnostic tests and consults completed and resulted   -vital signs normal or at patient baseline   -tolerating oral intake to maintain hydration   -safe disposition plan has been identified   Nurse to notify provider  when observation goals have been met and patient is ready for discharge.

## 2020-06-21 NOTE — PLAN OF CARE
PT: PT orders received.  Per RN pt has been getting up with staff to bed side chair.  PT to hold evaluation till tomorrow and initiate as needed per medical plan.

## 2020-06-22 NOTE — PROGRESS NOTES
Social Work Services Discharge Note      Patient Name:  Aureliano Knight     Anticipated Discharge Date:  6/22/2020 @ 1700 - ride time confirmed with family and hospice    Discharge Disposition:   Hospice:  Home    Following MD:  Dr. Flores     Additional Services/Equipment Arranged:  [facility] Stretcher ride, form placed in chart     Patient / Family response to discharge plan:  Agreeable     Persons notified of above discharge plan:  Provider, RN, daughter Shena, [facility] hospice    Staff Discharge Instructions:  Please fax discharge orders and signed hard scripts for any controlled substances.  Please print a packet and send with patient.     CTS Handoff completed:  YES    Medicare Notice of Rights provided to the patient/family:  YES    [facility] contact:  Kristy cell: [phone]  Fax: [phone] (orders faxed 11:51AM)    MARY Ascencio  Unit 5/Unit 8 Ortho/Med/Surg & [facility] Adult ED  Phone: [phone] Pager: [phone]

## 2020-06-22 NOTE — DISCHARGE SUMMARY
[facility], [place]  Hospitalist Discharge Summary      Date of Admission:  6/20/2020  Date of Discharge:  6/22/2020  Discharging Provider: Spring Flores MD      Discharge Diagnoses   Advanced dementia  Failure to thrive  Suspect Hypertension     Discharge Disposition   Discharged to home  Condition at discharge: Stable      Hospital Course   Aureliano Knight is a 85 year old male  with Hx of dementia. Wife believes he had dementia for 7 years, but believes that its been progressively getting worse in last two years. He was brought in due to frequent falls, he not being himself, failing to thrive.  On enquiry he got mirtazapine 7.5 mg hs for two days before the falls and confusion. No history of other medical problems.        # Failure to thrive: 85 M with advanced dementia. Dementia has been progressive. He got mirtazapine for two days which may have caused acute worsening and confusion and falls. NO UTI noted. No other obvious infection noted. CT head is negative. His dementia is progressively getting worse. He is dependent on care by family. His functional capacity is low.   After family and SW consult he was placed on Hospice care      # Dementia: Not on any medications. Has been advancing.      # Suspect Hypertension : Diastolic BP was on high side on admission. Monitor BP. Target SBP for him is 150-160.      # Ankle Edema: BL ankle edema noted. Its been chronic for years. Likely venous stasis. Monitor. Can do stockings, but he has done well for so long. It may not change much for him.        Consultations This Hospital Stay   PHYSICAL THERAPY ADULT IP CONSULT  OCCUPATIONAL THERAPY ADULT IP CONSULT  SOCIAL WORK IP CONSULT  PALLIATIVE CARE ADULT IP CONSULT  MEDICATION HISTORY IP PHARMACY CONSULT    Code Status   DNR/DNI    Time Spent on this Encounter   I, Spring Flores MD, personally saw the patient today and spent greater than 30 minutes discharging this patient.        Spring Flores MD  [facility], [place]  ______________________________________________________________________    Physical Exam   Vital Signs: Temp: 97.9  F (36.6  C) Temp src: Axillary BP: (!) 149/61 Pulse: 64   Resp: 22 SpO2: 93 % O2 Device: None (Room air)    Weight: 0 lbs 0 oz  Respiratory: No increased work of breathing, good air exchange, clear to auscultation bilaterally, no crackles or wheezing  Cardiovascular: Normal apical impulse, regular rate and rhythm, normal S1 and S2, no S3 or S4, and no murmur noted  GI: No scars, normal bowel sounds, soft, non-distended, non-tender, no masses palpated, no hepatosplenomegally  Confused , but pleasant and interactive  Plan was discussed with RN/Social work and wife Wilma        Primary Care Physician   Jean Paul Lee    Discharge Orders      Reason for your hospital stay    Patient is going home on home hospice     Adult [facility] Follow-up and recommended labs and tests    Follow up with hospice provider  within 7 days for hospital follow- up.  No follow up labs or test are needed.      Appointments on [place] and/or [place] campus (with [facility] or [facility] provider or service). Call [phone] if you haven't heard regarding these appointments within 7 days of discharge.     Activity    Your activity upon discharge: activity as tolerated     Diet    Follow this diet upon discharge: Orders Placed This Encounter      Regular Diet Adult       Significant Results and Procedures   Most Recent 3 CBC's:  Recent Labs   Lab Test 06/21/20  0737 06/20/20  0749 02/11/20  1025   WBC 11.5* 10.2 10.0   HGB 12.3* 11.4* 11.7*   MCV 91 92 93    310 357     Most Recent 3 BMP's:  Recent Labs   Lab Test 06/21/20  0737 06/20/20  0749 02/11/20  1025    143 140   POTASSIUM 3.6 3.9 4.0   CHLORIDE 109 110* 110*   CO2 24 30 24   BUN 13 17 19   CR 0.66 0.75 0.78   ANIONGAP 8 3 6   BRIANNA 8.8 8.9 8.7   GLC 73 87 94       Discharge Medications    There are no discharge medications for this patient.    Allergies   Allergies   Allergen Reactions     Penicillins

## 2020-06-22 NOTE — PLAN OF CARE
VS:   BP (!) 152/70 (BP Location: Left arm)   Pulse 62   Temp 98  F (36.7  C) (Oral)   Resp 20   SpO2 100%      Output:   Incontinent of bowel and bladder. BM x2. Attends in place   Lungs LS clear, Sats 100% on RA   Activity:   Repositioned q2h and prn   Skin: Intact except for multiple skin tears L elbow area, R elbow area, scabbing L lower leg, scattered bruising. Scab on nose.    Pain:   denies   Neuro/CMS:   Confused, calm, needs frequent cuing    Dressing(s):   Tegaderm R elbow area over skin tears. L elbow area redressed with non adherent dsg, covered with kerlix, ( pt picks at wounds)    Diet:   Regular diet, total feed this evening, poor appetite.   LDA:   PIV x1   Equipment:   walker   Plan:   Discharge to home 6/22 with daughter,  hospice to follow.

## 2020-06-22 NOTE — PROGRESS NOTES
Social Work Services Progress Note    Hospital Day: 3  Date of Initial Social Work Evaluation:  Not yet completed, pt discharging today  Collaborated with:  Daughter Shena [phone], chart review    Data:  SW following for discharge. Per medical team, family has coordinated with hospice and will likely take pt home today. SW called pt's daughter Shena to confirm. Shena stated that [facility] would be arriving any minute to sign the hospice forms. Shena reports that they plan to take pt home today after signing on to hospice. Shena requesting assistance from hospice/hospital for pt's transportation home. SW to coordinate with hospice after paperwork is signed. Hospice arrived at Shena's home at the end of the phone call.    1133: WOLFGANG spoke with Kristy through [facility] [phone]. Kristy confirming that pt was enrolled in hospice. Per Kristy, family wanting a 5pm ride home. WOLFGANG scheduled transport and updated pt's daughter, Shena.    Intervention:  Coordination of care, discharge planning    Assessment:  Family signing hospice forms today, pt will discharge after    Plan:    Anticipated Disposition:  Home with services    Barriers to d/c plan:  None    Follow Up:  SW to continue to follow    MARY Ascencio  [facility] [facility]  Phone: [phone] Pager: [phone]

## 2020-06-22 NOTE — PLAN OF CARE
PT- DEFER.     Discharge Planner PT   Patient plan for discharge: home with hospice  Current status: PT eval orders received. Per chart review and discussion with SW, plan for pt to discharge home on hospice today. Pt has been mobilizing to chair throughout hospital stay and will have assistance with discharge. No skilled inpatient PT needs identified, will complete orders.   Barriers to return to prior living situation: no PT barriers  Recommendations for discharge: home w/ hospice  Rationale for recommendations: See above.        Entered by: Jessica Cruz 06/22/2020 10:36 AM

## 2020-06-22 NOTE — PLAN OF CARE
VS: BP (!) 152/70 (BP Location: Left arm)   Pulse 62   Temp 98  F (36.7  C) (Axillary)   Resp 20   SpO2 98%    O2: Room air. No s/s of SoB. Lung sounds clear   Output: Pt was continent of urine x1 overnight. Said he needed to go pee, and used urinal with staff assistance. Incontinent x1.   Last BM: LBM: 6/21/20. Pt had large incontinent BM just after shift change.    Activity: Transfers Ax2 w/ walker and gait belt. Remained in bed overnight. Weight shift assistance provided when Pt was awake.    Skin: Pt has scattered bruising all over his body (especially on arms). Skin tear on (R) arm and elbow covered with Tegaderm. (L) arm skin tear covered with Tegaderm and wrapped in kerlix. Redness noted on Pts Penis when performing juno care.  Blanchable redness on coccyx   Pain: Pt denied pain when asked. Appears comfortable at this time   CMS: Pt is confused. Oriented to self only, disoriented to place, time, and situation. Pt when awake has been reaching out into thin air, and having illogical conversations. When engaged in conversation Pt has been pleasant tonight.    Dressing: Dressings have scant amount of drainage on them, otherwise intact. Dressings remaining in place due to Pt's fragile skin. Unable to remove and change at this time without possible causing more damage.    Diet: Regular diet. Fluids offered when awake. Pt has taken sips of water periodically throughout the night.    LDA: PIV in (L) AC. Saline locked.   Equipment: Walker, wheelchair. Sitter at bedside   Plan: Continue with POC. Discharge home with daughter. Hospice to follow.    Additional Info:

## 2020-06-22 NOTE — PHARMACY - DISCHARGE MEDICATION RECONCILIATION
"Pharmacy Discharge Medication Reconciliation  The SOC Discharge Team has been consulted to provide discharge summary and discharge medication reconciliation assistance for this patient. The following includes a summary of medications that were reconciled for discharge and medications that still require final clinical decision by the provider.     Current discharge plan: Hospice   Approximate discharge date: 6/22/2020  Patient's preferred pharmacy: N/A    Medications still requiring physician discharge medication reconciliation/clinical decision      Medications reconciled as \"PRESCRIBE\" that are NEW or MODIFIED this hospitalization      Medications reconciled as \"RESUME\" at discharge   - All PTA medications were resumed at the same doses and directions unless otherwise noted    Medications reconciled as \"DISCONTINUE\" at discharge  - All protocol medications, IV medications, and PRN medications not in use were discontinued unless otherwise noted    Please note:   *All OTC medications are not E-prescribed unless otherwise noted. Please review and E-prescribe if patient requests.   *Patient's orders were routed to preferred pharmacy (see above). If unable to determine, will route to Hospital Discharge Pharmacy.   *Inpatient medication use was evaluated for discharge medication reconciliation. PRN medications with infrequent use or no use within the last 48 hours or medications ordered through an orderset or protocol that are not appropriate for discharge were not prescribed.      Thank you for the opportunity to care for this patient    Adelfo Grant  Pharmacy Student  [phone]      "

## 2020-06-22 NOTE — PLAN OF CARE
Patient alert and disoriented to place, time and situation. Been sleeping all shift, repositioned and turned in bed. Heels elevated on pillows. Incontinent of bladder and bowel- juno care done after diaper change.   Has lots of bruises/redness all over his body. Left arm  Wrapped.  Bed side attendant at bed side. Will continue to monitor patient and discharge at 1700 today.

## 2020-06-23 NOTE — PROGRESS NOTES
Patient has been placed on hospice.  No call will be placed due to protocols. Encounter will be closed.

## 2020-06-23 NOTE — PROGRESS NOTES
Clinic Care Coordination Contact  Community Health Worker Initial Outreach    Patient accepts CC: No, pt is in hospice care after being hospitalized. Spoke with pt's wife, Fredy. She said he is not doing well. Pt's wife said she will reach out if she needs anything. Gave her my name and phone number. Sending a note to pt's PCP.

## 2020-06-23 NOTE — PLAN OF CARE
.      VS:   BP (!) 149/61 (BP Location: Right arm)   Pulse 64   Temp 97.9  F (36.6  C) (Axillary)   Resp 22   SpO2 93%    Room air, no shortness of breath.     Output:   Incontinent of bowel and bladder. Incontinence x1 of urine.   Activity:   Bedfast. Repositioned with 2 ppl ev 2 hrs.   Skin: Generalized bruising and redness.   Pain:   Denies pain.   Neuro/CMS:   Alert and oriented to self. Generalized weakness. Unable to assess sensitivity.    Dressing(s):   Clear dressing on right arm.   Diet:   Regular diet     LDA:   No LDAs.   Equipment:   Call light.   Plan:   Patient discharged at 7:15pm with [facility] transport to home with daughter and hospice services.   Additional Info:   High fall risk.

## 2020-06-24 NOTE — TELEPHONE ENCOUNTER
Reason for Call:  Other FYI    Detailed comments: Esthela with [facility] is calling in stating that the patient passed away on 06/24/20 at 7:55AM.    Phone Number Patient can be reached at: Other phone number:  [phone]*    Best Time: NA    Can we leave a detailed message on this number? YES    Call taken on 6/24/2020 at 9:24 AM by Sara Byrnes

## 2020-06-24 NOTE — TELEPHONE ENCOUNTER
[facility] with [facility] called and needs Dr. Lee to:    Sign Certificate of Terminal Illness     Sign Death Certificate at Time of Death    Patient was discharged home late on Monday 6-22-20.    [phone] - [facility]

## 2020-07-03 ENCOUNTER — DOCUMENTATION ONLY (OUTPATIENT)
Dept: FAMILY MEDICINE | Facility: CLINIC | Age: 85
End: 2020-07-03

## 2021-05-26 VITALS
HEART RATE: 56 BPM | SYSTOLIC BLOOD PRESSURE: 94 MMHG | DIASTOLIC BLOOD PRESSURE: 63 MMHG | OXYGEN SATURATION: 98 % | TEMPERATURE: 97.6 F

## 2021-05-26 VITALS — DIASTOLIC BLOOD PRESSURE: 61 MMHG | SYSTOLIC BLOOD PRESSURE: 109 MMHG | HEART RATE: 67 BPM

## 2021-05-26 VITALS — HEART RATE: 64 BPM | SYSTOLIC BLOOD PRESSURE: 124 MMHG | DIASTOLIC BLOOD PRESSURE: 72 MMHG | OXYGEN SATURATION: 97 %

## 2021-05-26 VITALS
OXYGEN SATURATION: 97 % | RESPIRATION RATE: 17 BRPM | DIASTOLIC BLOOD PRESSURE: 68 MMHG | TEMPERATURE: 97.8 F | SYSTOLIC BLOOD PRESSURE: 118 MMHG | HEART RATE: 71 BPM

## 2021-05-26 VITALS
OXYGEN SATURATION: 98 % | TEMPERATURE: 96.8 F | DIASTOLIC BLOOD PRESSURE: 71 MMHG | SYSTOLIC BLOOD PRESSURE: 125 MMHG | HEART RATE: 58 BPM

## 2021-05-26 VITALS — DIASTOLIC BLOOD PRESSURE: 72 MMHG | SYSTOLIC BLOOD PRESSURE: 124 MMHG | HEART RATE: 62 BPM | OXYGEN SATURATION: 97 %

## 2021-05-27 VITALS
DIASTOLIC BLOOD PRESSURE: 82 MMHG | OXYGEN SATURATION: 97 % | SYSTOLIC BLOOD PRESSURE: 122 MMHG | HEART RATE: 75 BPM | RESPIRATION RATE: 16 BRPM | TEMPERATURE: 98.7 F

## 2021-05-27 VITALS — OXYGEN SATURATION: 96 % | SYSTOLIC BLOOD PRESSURE: 126 MMHG | DIASTOLIC BLOOD PRESSURE: 72 MMHG | HEART RATE: 62 BPM

## 2021-05-27 VITALS
SYSTOLIC BLOOD PRESSURE: 140 MMHG | HEART RATE: 86 BPM | RESPIRATION RATE: 24 BRPM | OXYGEN SATURATION: 97 % | DIASTOLIC BLOOD PRESSURE: 78 MMHG

## 2021-05-27 VITALS — DIASTOLIC BLOOD PRESSURE: 70 MMHG | SYSTOLIC BLOOD PRESSURE: 124 MMHG | OXYGEN SATURATION: 97 % | HEART RATE: 60 BPM

## 2021-05-27 VITALS — OXYGEN SATURATION: 97 % | SYSTOLIC BLOOD PRESSURE: 122 MMHG | HEART RATE: 62 BPM | DIASTOLIC BLOOD PRESSURE: 72 MMHG

## 2021-05-27 VITALS
HEART RATE: 78 BPM | DIASTOLIC BLOOD PRESSURE: 64 MMHG | SYSTOLIC BLOOD PRESSURE: 116 MMHG | OXYGEN SATURATION: 98 % | RESPIRATION RATE: 18 BRPM | TEMPERATURE: 97.9 F

## 2021-05-27 VITALS — SYSTOLIC BLOOD PRESSURE: 117 MMHG | DIASTOLIC BLOOD PRESSURE: 68 MMHG | HEART RATE: 75 BPM

## 2021-05-27 VITALS — OXYGEN SATURATION: 97 % | HEART RATE: 64 BPM | SYSTOLIC BLOOD PRESSURE: 126 MMHG | DIASTOLIC BLOOD PRESSURE: 72 MMHG

## 2021-06-08 NOTE — PROGRESS NOTES
Patient goal: Pt to remain in the home safely and improve endurance, strength, balance and ability to perform ADL's. Needs assistance with determining long term assistance in the home.   Reason for this episode: Pt functional state has decreased and would benefit from PT and OT in the home along with HHA to assist with bathing and grooming. MSW assist with finding long term care assistance in home. SN educate and assist with safety in t  he home and edema in lower extremitites.   Pertinent medical history: Dementia, Parkinson's Disease, Early onset alhzemiers  Precautions/safety: Pt high risk for falls  Prior Level of Function: Has needed assistance from wife and daughter to perform ADL's, has been requiring more assitance lately and had a fall about a month or so ago.   Current Level of Function/(ex. gait, transfer, ADL assist, etc.): Requires assistance with getting u  p, has cane and walker but tends to forget to use. Has someone with at all times to provide support with walking and performing ADL's. Pt's daughter comes over daily to assist with cares.   People present at visit: Pt, pt's wife, and pt's daughter  Home Environment and Assistance Available: Someone is available 24hours, daughter comes over to assist during the day and lives within 10mins.   Multidisciplinary Plan/Follow up Needs (ex. ot  her disciplines ordered and why):    RN: 1wk2 for Assess and educate on edema in lower extremitites and safety within home.   PT: Eval and treat for poor balance, gait and endurance. Family believes his lower extremity strength could be improved.  OT: Eval and treat for safety with ADL's in home and assess for need of shower bench, hospital bed, and assist with using proper AD.   HHA: 1wk1 2wk1 for ADL's, grooming and bathing assistance  . No preferences at this time for gender.   MSW: Eval for need for long term care solution in home or other options for long term care. Was told that he does not qualify for any PCA  services, any other services may he qualify for?

## 2022-02-17 PROBLEM — R62.51 FAILURE TO THRIVE IN PEDIATRIC PATIENT: Status: ACTIVE | Noted: 2020-01-01

## 2022-12-08 NOTE — TELEPHONE ENCOUNTER
Reason for Call:  Other Letter    Detailed comments: Shena is picking up letter at  [place] @ 1:00 PM on 6-12-20, and Janeth will Notarize the letter, per Dr. Lee.    Letter is regarding the fact her father has Alzheimer's and his signature has changed; patient unable to sign.    Phone Number Patient can be reached at: Cell number on file:    Telephone Information:   Mobile [phone]       Best Time: asap    Can we leave a detailed message on this number? YES    Call taken on 6/10/2020 at 9:32 AM by Di White     Detail Level: Detailed Quality 111:Pneumonia Vaccination Status For Older Adults: Pneumococcal Vaccination Previously Received Quality 130: Documentation Of Current Medications In The Medical Record: Current Medications Documented Quality 110: Preventive Care And Screening: Influenza Immunization: Influenza Immunization Administered during Influenza season Quality 474: Zoster Vaccination Status: Shingrix Vaccination Administered or Previously Received
